# Patient Record
Sex: MALE | Race: WHITE | NOT HISPANIC OR LATINO
[De-identification: names, ages, dates, MRNs, and addresses within clinical notes are randomized per-mention and may not be internally consistent; named-entity substitution may affect disease eponyms.]

---

## 2017-08-23 ENCOUNTER — APPOINTMENT (OUTPATIENT)
Dept: FAMILY MEDICINE | Facility: CLINIC | Age: 62
End: 2017-08-23
Payer: COMMERCIAL

## 2017-08-23 VITALS
WEIGHT: 212.13 LBS | HEIGHT: 73 IN | SYSTOLIC BLOOD PRESSURE: 130 MMHG | DIASTOLIC BLOOD PRESSURE: 72 MMHG | BODY MASS INDEX: 28.11 KG/M2

## 2017-08-23 VITALS — DIASTOLIC BLOOD PRESSURE: 80 MMHG | SYSTOLIC BLOOD PRESSURE: 110 MMHG | HEART RATE: 72 BPM | RESPIRATION RATE: 16 BRPM

## 2017-08-23 PROCEDURE — 99214 OFFICE O/P EST MOD 30 MIN: CPT | Mod: 25

## 2017-08-23 PROCEDURE — G0008: CPT

## 2017-08-23 PROCEDURE — 36415 COLL VENOUS BLD VENIPUNCTURE: CPT

## 2017-08-23 PROCEDURE — 90688 IIV4 VACCINE SPLT 0.5 ML IM: CPT

## 2017-08-23 PROCEDURE — 90472 IMMUNIZATION ADMIN EACH ADD: CPT

## 2017-08-23 PROCEDURE — 90732 PPSV23 VACC 2 YRS+ SUBQ/IM: CPT

## 2017-08-24 LAB
ALBUMIN SERPL ELPH-MCNC: 4.3 G/DL
ALP BLD-CCNC: 91 U/L
ALT SERPL-CCNC: 28 U/L
ANION GAP SERPL CALC-SCNC: 15 MMOL/L
APPEARANCE: CLEAR
AST SERPL-CCNC: 26 U/L
BACTERIA: NEGATIVE
BASOPHILS # BLD AUTO: 0.03 K/UL
BASOPHILS NFR BLD AUTO: 0.5 %
BILIRUB SERPL-MCNC: 1.2 MG/DL
BILIRUBIN URINE: NEGATIVE
BLOOD URINE: NEGATIVE
BUN SERPL-MCNC: 9 MG/DL
CALCIUM SERPL-MCNC: 9.4 MG/DL
CHLORIDE SERPL-SCNC: 102 MMOL/L
CHOLEST SERPL-MCNC: 201 MG/DL
CHOLEST/HDLC SERPL: 5.3 RATIO
CO2 SERPL-SCNC: 23 MMOL/L
COLOR: YELLOW
CREAT SERPL-MCNC: 0.87 MG/DL
CREAT SPEC-SCNC: 114 MG/DL
EOSINOPHIL # BLD AUTO: 0.09 K/UL
EOSINOPHIL NFR BLD AUTO: 1.4 %
GLUCOSE QUALITATIVE U: NORMAL MG/DL
GLUCOSE SERPL-MCNC: 90 MG/DL
HBA1C MFR BLD HPLC: 7.7 %
HCT VFR BLD CALC: 45.8 %
HDLC SERPL-MCNC: 38 MG/DL
HGB BLD-MCNC: 15.6 G/DL
HYALINE CASTS: 0 /LPF
IMM GRANULOCYTES NFR BLD AUTO: 0.2 %
KETONES URINE: NEGATIVE
LDLC SERPL CALC-MCNC: 143 MG/DL
LEUKOCYTE ESTERASE URINE: NEGATIVE
LYMPHOCYTES # BLD AUTO: 2.2 K/UL
LYMPHOCYTES NFR BLD AUTO: 33.5 %
MAN DIFF?: NORMAL
MCHC RBC-ENTMCNC: 29.8 PG
MCHC RBC-ENTMCNC: 34.1 GM/DL
MCV RBC AUTO: 87.6 FL
MICROALBUMIN 24H UR DL<=1MG/L-MCNC: 0.5 MG/DL
MICROALBUMIN/CREAT 24H UR-RTO: 4 MG/G
MICROSCOPIC-UA: NORMAL
MONOCYTES # BLD AUTO: 0.52 K/UL
MONOCYTES NFR BLD AUTO: 7.9 %
NEUTROPHILS # BLD AUTO: 3.72 K/UL
NEUTROPHILS NFR BLD AUTO: 56.5 %
NITRITE URINE: NEGATIVE
PH URINE: 6
PLATELET # BLD AUTO: 283 K/UL
POTASSIUM SERPL-SCNC: 4.4 MMOL/L
PROT SERPL-MCNC: 7.5 G/DL
PROTEIN URINE: NEGATIVE MG/DL
RBC # BLD: 5.23 M/UL
RBC # FLD: 12.6 %
RED BLOOD CELLS URINE: 1 /HPF
SODIUM SERPL-SCNC: 140 MMOL/L
SPECIFIC GRAVITY URINE: 1.01
SQUAMOUS EPITHELIAL CELLS: 0 /HPF
T4 SERPL-MCNC: 7.3 UG/DL
TRIGL SERPL-MCNC: 102 MG/DL
TSH SERPL-ACNC: 3.61 UIU/ML
URATE SERPL-MCNC: 4.5 MG/DL
UROBILINOGEN URINE: NORMAL MG/DL
WBC # FLD AUTO: 6.57 K/UL
WHITE BLOOD CELLS URINE: 0 /HPF

## 2017-09-11 ENCOUNTER — RX RENEWAL (OUTPATIENT)
Age: 62
End: 2017-09-11

## 2017-09-14 ENCOUNTER — RX RENEWAL (OUTPATIENT)
Age: 62
End: 2017-09-14

## 2017-09-20 ENCOUNTER — TRANSCRIPTION ENCOUNTER (OUTPATIENT)
Age: 62
End: 2017-09-20

## 2017-09-27 ENCOUNTER — APPOINTMENT (OUTPATIENT)
Dept: FAMILY MEDICINE | Facility: CLINIC | Age: 62
End: 2017-09-27
Payer: COMMERCIAL

## 2017-09-27 ENCOUNTER — RX RENEWAL (OUTPATIENT)
Age: 62
End: 2017-09-27

## 2017-09-27 ENCOUNTER — APPOINTMENT (OUTPATIENT)
Dept: CARDIOLOGY | Facility: CLINIC | Age: 62
End: 2017-09-27
Payer: COMMERCIAL

## 2017-09-27 ENCOUNTER — NON-APPOINTMENT (OUTPATIENT)
Age: 62
End: 2017-09-27

## 2017-09-27 VITALS
BODY MASS INDEX: 27.43 KG/M2 | HEIGHT: 73 IN | SYSTOLIC BLOOD PRESSURE: 132 MMHG | OXYGEN SATURATION: 95 % | HEART RATE: 93 BPM | WEIGHT: 207 LBS | DIASTOLIC BLOOD PRESSURE: 81 MMHG

## 2017-09-27 VITALS
BODY MASS INDEX: 27.43 KG/M2 | SYSTOLIC BLOOD PRESSURE: 150 MMHG | OXYGEN SATURATION: 98 % | WEIGHT: 207 LBS | HEIGHT: 73 IN | HEART RATE: 96 BPM | DIASTOLIC BLOOD PRESSURE: 80 MMHG

## 2017-09-27 DIAGNOSIS — Z86.69 PERSONAL HISTORY OF OTHER DISEASES OF THE NERVOUS SYSTEM AND SENSE ORGANS: ICD-10-CM

## 2017-09-27 PROCEDURE — 99245 OFF/OP CONSLTJ NEW/EST HI 55: CPT

## 2017-09-27 PROCEDURE — 93000 ELECTROCARDIOGRAM COMPLETE: CPT

## 2017-09-27 PROCEDURE — 99214 OFFICE O/P EST MOD 30 MIN: CPT | Mod: 25

## 2017-09-28 ENCOUNTER — INPATIENT (INPATIENT)
Facility: HOSPITAL | Age: 62
LOS: 5 days | Discharge: HOME | DRG: 233 | End: 2017-10-04
Attending: THORACIC SURGERY (CARDIOTHORACIC VASCULAR SURGERY) | Admitting: INTERNAL MEDICINE
Payer: COMMERCIAL

## 2017-09-28 ENCOUNTER — RX RENEWAL (OUTPATIENT)
Age: 62
End: 2017-09-28

## 2017-09-28 VITALS
DIASTOLIC BLOOD PRESSURE: 74 MMHG | OXYGEN SATURATION: 98 % | RESPIRATION RATE: 18 BRPM | HEART RATE: 92 BPM | HEIGHT: 73 IN | SYSTOLIC BLOOD PRESSURE: 136 MMHG | TEMPERATURE: 99 F | WEIGHT: 207.01 LBS

## 2017-09-28 DIAGNOSIS — E78.5 HYPERLIPIDEMIA, UNSPECIFIED: ICD-10-CM

## 2017-09-28 DIAGNOSIS — E10.59 TYPE 1 DIABETES MELLITUS WITH OTHER CIRCULATORY COMPLICATIONS: ICD-10-CM

## 2017-09-28 DIAGNOSIS — I25.118 ATHEROSCLEROTIC HEART DISEASE OF NATIVE CORONARY ARTERY WITH OTHER FORMS OF ANGINA PECTORIS: ICD-10-CM

## 2017-09-28 DIAGNOSIS — Z98.890 OTHER SPECIFIED POSTPROCEDURAL STATES: Chronic | ICD-10-CM

## 2017-09-28 DIAGNOSIS — I20.0 UNSTABLE ANGINA: ICD-10-CM

## 2017-09-28 DIAGNOSIS — I10 ESSENTIAL (PRIMARY) HYPERTENSION: ICD-10-CM

## 2017-09-28 LAB
ALBUMIN SERPL ELPH-MCNC: 4.2 G/DL — SIGNIFICANT CHANGE UP (ref 3.3–5)
ALBUMIN SERPL ELPH-MCNC: 4.3 G/DL — SIGNIFICANT CHANGE UP (ref 3.3–5)
ALP SERPL-CCNC: 84 U/L — SIGNIFICANT CHANGE UP (ref 40–120)
ALP SERPL-CCNC: 87 U/L — SIGNIFICANT CHANGE UP (ref 40–120)
ALT FLD-CCNC: 20 U/L RC — SIGNIFICANT CHANGE UP (ref 10–45)
ALT FLD-CCNC: 20 U/L RC — SIGNIFICANT CHANGE UP (ref 10–45)
ANION GAP SERPL CALC-SCNC: 18 MMOL/L — HIGH (ref 5–17)
APPEARANCE UR: CLEAR — SIGNIFICANT CHANGE UP
APTT BLD: 32.8 SEC — SIGNIFICANT CHANGE UP (ref 27.5–37.4)
APTT BLD: 34.3 SEC — SIGNIFICANT CHANGE UP (ref 27.5–37.4)
AST SERPL-CCNC: 20 U/L — SIGNIFICANT CHANGE UP (ref 10–40)
AST SERPL-CCNC: 24 U/L — SIGNIFICANT CHANGE UP (ref 10–40)
BILIRUB DIRECT SERPL-MCNC: 0.4 MG/DL — HIGH (ref 0–0.2)
BILIRUB INDIRECT FLD-MCNC: 0.9 MG/DL — SIGNIFICANT CHANGE UP (ref 0.2–1)
BILIRUB SERPL-MCNC: 1.3 MG/DL — HIGH (ref 0.2–1.2)
BILIRUB SERPL-MCNC: 1.4 MG/DL — HIGH (ref 0.2–1.2)
BILIRUB UR-MCNC: NEGATIVE — SIGNIFICANT CHANGE UP
BLD GP AB SCN SERPL QL: POSITIVE — SIGNIFICANT CHANGE UP
BUN SERPL-MCNC: 13 MG/DL — SIGNIFICANT CHANGE UP (ref 7–23)
CALCIUM SERPL-MCNC: 9.5 MG/DL — SIGNIFICANT CHANGE UP (ref 8.4–10.5)
CHLORIDE SERPL-SCNC: 99 MMOL/L — SIGNIFICANT CHANGE UP (ref 96–108)
CHOLEST SERPL-MCNC: 180 MG/DL — SIGNIFICANT CHANGE UP (ref 10–199)
CO2 SERPL-SCNC: 22 MMOL/L — SIGNIFICANT CHANGE UP (ref 22–31)
COLOR SPEC: YELLOW — SIGNIFICANT CHANGE UP
CREAT SERPL-MCNC: 0.96 MG/DL — SIGNIFICANT CHANGE UP (ref 0.5–1.3)
DAT POLY-SP REAG RBC QL: NEGATIVE — SIGNIFICANT CHANGE UP
DIFF PNL FLD: NEGATIVE — SIGNIFICANT CHANGE UP
GLUCOSE SERPL-MCNC: 228 MG/DL — HIGH (ref 70–99)
GLUCOSE UR QL: >1000
HBA1C BLD-MCNC: 7.5 % — HIGH (ref 4–5.6)
HCT VFR BLD CALC: 41.1 % — SIGNIFICANT CHANGE UP (ref 39–50)
HCT VFR BLD CALC: 46.8 % — SIGNIFICANT CHANGE UP (ref 39–50)
HGB BLD-MCNC: 14.7 G/DL — SIGNIFICANT CHANGE UP (ref 13–17)
HGB BLD-MCNC: 16 G/DL — SIGNIFICANT CHANGE UP (ref 13–17)
INR BLD: 1.07 RATIO — SIGNIFICANT CHANGE UP (ref 0.88–1.16)
KETONES UR-MCNC: ABNORMAL
LEUKOCYTE ESTERASE UR-ACNC: NEGATIVE — SIGNIFICANT CHANGE UP
MCHC RBC-ENTMCNC: 30.9 PG — SIGNIFICANT CHANGE UP (ref 27–34)
MCHC RBC-ENTMCNC: 32.2 PG — SIGNIFICANT CHANGE UP (ref 27–34)
MCHC RBC-ENTMCNC: 34.2 GM/DL — SIGNIFICANT CHANGE UP (ref 32–36)
MCHC RBC-ENTMCNC: 35.6 GM/DL — SIGNIFICANT CHANGE UP (ref 32–36)
MCV RBC AUTO: 90.2 FL — SIGNIFICANT CHANGE UP (ref 80–100)
MCV RBC AUTO: 90.3 FL — SIGNIFICANT CHANGE UP (ref 80–100)
NITRITE UR-MCNC: NEGATIVE — SIGNIFICANT CHANGE UP
NT-PROBNP SERPL-SCNC: 863 PG/ML — HIGH (ref 0–300)
NT-PROBNP SERPL-SCNC: 985 PG/ML — HIGH (ref 0–300)
PH UR: 6 — SIGNIFICANT CHANGE UP (ref 5–8)
PLATELET # BLD AUTO: 254 K/UL — SIGNIFICANT CHANGE UP (ref 150–400)
PLATELET # BLD AUTO: 272 K/UL — SIGNIFICANT CHANGE UP (ref 150–400)
POTASSIUM SERPL-MCNC: 4.3 MMOL/L — SIGNIFICANT CHANGE UP (ref 3.5–5.3)
POTASSIUM SERPL-SCNC: 4.3 MMOL/L — SIGNIFICANT CHANGE UP (ref 3.5–5.3)
PROT SERPL-MCNC: 7.3 G/DL — SIGNIFICANT CHANGE UP (ref 6–8.3)
PROT SERPL-MCNC: 7.6 G/DL — SIGNIFICANT CHANGE UP (ref 6–8.3)
PROT UR-MCNC: SIGNIFICANT CHANGE UP
PROTHROM AB SERPL-ACNC: 11.7 SEC — SIGNIFICANT CHANGE UP (ref 9.8–12.7)
RBC # BLD: 4.55 M/UL — SIGNIFICANT CHANGE UP (ref 4.2–5.8)
RBC # BLD: 5.18 M/UL — SIGNIFICANT CHANGE UP (ref 4.2–5.8)
RBC # FLD: 11.3 % — SIGNIFICANT CHANGE UP (ref 10.3–14.5)
RBC # FLD: 11.4 % — SIGNIFICANT CHANGE UP (ref 10.3–14.5)
RH IG SCN BLD-IMP: POSITIVE — SIGNIFICANT CHANGE UP
RH IG SCN BLD-IMP: POSITIVE — SIGNIFICANT CHANGE UP
SODIUM SERPL-SCNC: 139 MMOL/L — SIGNIFICANT CHANGE UP (ref 135–145)
SP GR SPEC: >1.03 — HIGH (ref 1.01–1.02)
T3 SERPL-MCNC: 126 NG/DL — SIGNIFICANT CHANGE UP (ref 80–200)
T4 AB SER-ACNC: 7.5 UG/DL — SIGNIFICANT CHANGE UP (ref 4.6–12)
TSH SERPL-MCNC: 4.83 UIU/ML — HIGH (ref 0.27–4.2)
UROBILINOGEN FLD QL: NEGATIVE — SIGNIFICANT CHANGE UP
WBC # BLD: 12.4 K/UL — HIGH (ref 3.8–10.5)
WBC # BLD: 8.2 K/UL — SIGNIFICANT CHANGE UP (ref 3.8–10.5)
WBC # FLD AUTO: 12.4 K/UL — HIGH (ref 3.8–10.5)
WBC # FLD AUTO: 8.2 K/UL — SIGNIFICANT CHANGE UP (ref 3.8–10.5)

## 2017-09-28 PROCEDURE — 99223 1ST HOSP IP/OBS HIGH 75: CPT | Mod: GC

## 2017-09-28 PROCEDURE — 93306 TTE W/DOPPLER COMPLETE: CPT | Mod: 26

## 2017-09-28 PROCEDURE — 93880 EXTRACRANIAL BILAT STUDY: CPT | Mod: 26

## 2017-09-28 PROCEDURE — 71010: CPT | Mod: 26

## 2017-09-28 PROCEDURE — 86077 PHYS BLOOD BANK SERV XMATCH: CPT

## 2017-09-28 RX ORDER — DEXTROSE 50 % IN WATER 50 %
25 SYRINGE (ML) INTRAVENOUS ONCE
Qty: 0 | Refills: 0 | Status: DISCONTINUED | OUTPATIENT
Start: 2017-09-28 | End: 2017-09-29

## 2017-09-28 RX ORDER — INSULIN GLARGINE 100 [IU]/ML
33 INJECTION, SOLUTION SUBCUTANEOUS ONCE
Qty: 0 | Refills: 0 | Status: COMPLETED | OUTPATIENT
Start: 2017-09-28 | End: 2017-09-28

## 2017-09-28 RX ORDER — DEXTROSE 50 % IN WATER 50 %
12.5 SYRINGE (ML) INTRAVENOUS ONCE
Qty: 0 | Refills: 0 | Status: DISCONTINUED | OUTPATIENT
Start: 2017-09-28 | End: 2017-09-29

## 2017-09-28 RX ORDER — SODIUM CHLORIDE 9 MG/ML
1000 INJECTION, SOLUTION INTRAVENOUS
Qty: 0 | Refills: 0 | Status: DISCONTINUED | OUTPATIENT
Start: 2017-09-28 | End: 2017-09-29

## 2017-09-28 RX ORDER — CHLORHEXIDINE GLUCONATE 213 G/1000ML
1 SOLUTION TOPICAL ONCE
Qty: 0 | Refills: 0 | Status: COMPLETED | OUTPATIENT
Start: 2017-09-28 | End: 2017-09-28

## 2017-09-28 RX ORDER — CHLORHEXIDINE GLUCONATE 213 G/1000ML
30 SOLUTION TOPICAL ONCE
Qty: 0 | Refills: 0 | Status: COMPLETED | OUTPATIENT
Start: 2017-09-28 | End: 2017-09-29

## 2017-09-28 RX ORDER — CEFUROXIME AXETIL 250 MG
1500 TABLET ORAL ONCE
Qty: 0 | Refills: 0 | Status: DISCONTINUED | OUTPATIENT
Start: 2017-09-28 | End: 2017-09-29

## 2017-09-28 RX ORDER — INSULIN GLARGINE 100 [IU]/ML
48 INJECTION, SOLUTION SUBCUTANEOUS EVERY MORNING
Qty: 0 | Refills: 0 | Status: DISCONTINUED | OUTPATIENT
Start: 2017-09-29 | End: 2017-09-29

## 2017-09-28 RX ORDER — ASPIRIN/CALCIUM CARB/MAGNESIUM 324 MG
81 TABLET ORAL DAILY
Qty: 0 | Refills: 0 | Status: DISCONTINUED | OUTPATIENT
Start: 2017-09-28 | End: 2017-09-29

## 2017-09-28 RX ORDER — HEPARIN SODIUM 5000 [USP'U]/ML
5600 INJECTION INTRAVENOUS; SUBCUTANEOUS EVERY 6 HOURS
Qty: 0 | Refills: 0 | Status: DISCONTINUED | OUTPATIENT
Start: 2017-09-28 | End: 2017-09-29

## 2017-09-28 RX ORDER — METOPROLOL TARTRATE 50 MG
25 TABLET ORAL
Qty: 0 | Refills: 0 | Status: DISCONTINUED | OUTPATIENT
Start: 2017-09-28 | End: 2017-09-29

## 2017-09-28 RX ORDER — INSULIN LISPRO 100/ML
VIAL (ML) SUBCUTANEOUS AT BEDTIME
Qty: 0 | Refills: 0 | Status: DISCONTINUED | OUTPATIENT
Start: 2017-09-28 | End: 2017-09-29

## 2017-09-28 RX ORDER — DEXTROSE 50 % IN WATER 50 %
1 SYRINGE (ML) INTRAVENOUS ONCE
Qty: 0 | Refills: 0 | Status: DISCONTINUED | OUTPATIENT
Start: 2017-09-28 | End: 2017-09-29

## 2017-09-28 RX ORDER — ATORVASTATIN CALCIUM 80 MG/1
80 TABLET, FILM COATED ORAL AT BEDTIME
Qty: 0 | Refills: 0 | Status: DISCONTINUED | OUTPATIENT
Start: 2017-09-28 | End: 2017-09-29

## 2017-09-28 RX ORDER — GLUCAGON INJECTION, SOLUTION 0.5 MG/.1ML
1 INJECTION, SOLUTION SUBCUTANEOUS ONCE
Qty: 0 | Refills: 0 | Status: DISCONTINUED | OUTPATIENT
Start: 2017-09-28 | End: 2017-09-29

## 2017-09-28 RX ORDER — INSULIN LISPRO 100/ML
VIAL (ML) SUBCUTANEOUS
Qty: 0 | Refills: 0 | Status: DISCONTINUED | OUTPATIENT
Start: 2017-09-28 | End: 2017-09-29

## 2017-09-28 RX ORDER — HEPARIN SODIUM 5000 [USP'U]/ML
INJECTION INTRAVENOUS; SUBCUTANEOUS
Qty: 25000 | Refills: 0 | Status: DISCONTINUED | OUTPATIENT
Start: 2017-09-28 | End: 2017-09-29

## 2017-09-28 RX ORDER — ALPRAZOLAM 0.25 MG
0.25 TABLET ORAL THREE TIMES A DAY
Qty: 0 | Refills: 0 | Status: DISCONTINUED | OUTPATIENT
Start: 2017-09-28 | End: 2017-09-29

## 2017-09-28 RX ORDER — INSULIN LISPRO 100/ML
6 VIAL (ML) SUBCUTANEOUS
Qty: 0 | Refills: 0 | Status: DISCONTINUED | OUTPATIENT
Start: 2017-09-28 | End: 2017-09-29

## 2017-09-28 RX ADMIN — HEPARIN SODIUM 1000 UNIT(S)/HR: 5000 INJECTION INTRAVENOUS; SUBCUTANEOUS at 16:35

## 2017-09-28 RX ADMIN — Medication 2: at 16:50

## 2017-09-28 RX ADMIN — Medication 81 MILLIGRAM(S): at 14:01

## 2017-09-28 RX ADMIN — Medication 6 UNIT(S): at 12:43

## 2017-09-28 RX ADMIN — Medication: at 12:43

## 2017-09-28 RX ADMIN — CHLORHEXIDINE GLUCONATE 1 APPLICATION(S): 213 SOLUTION TOPICAL at 21:56

## 2017-09-28 RX ADMIN — ATORVASTATIN CALCIUM 80 MILLIGRAM(S): 80 TABLET, FILM COATED ORAL at 21:57

## 2017-09-28 RX ADMIN — Medication 6 UNIT(S): at 16:51

## 2017-09-28 RX ADMIN — Medication 25 MILLIGRAM(S): at 17:50

## 2017-09-28 RX ADMIN — HEPARIN SODIUM 1300 UNIT(S)/HR: 5000 INJECTION INTRAVENOUS; SUBCUTANEOUS at 23:50

## 2017-09-28 RX ADMIN — INSULIN GLARGINE 33 UNIT(S): 100 INJECTION, SOLUTION SUBCUTANEOUS at 11:55

## 2017-09-28 RX ADMIN — Medication 0.25 MILLIGRAM(S): at 23:54

## 2017-09-28 NOTE — CONSULT NOTE ADULT - ASSESSMENT
61 y/o M DM1, HTN now s/p cardiac cath revealing triple vessel, undergoing evaluation for CABG; also with uncontrolled DM1 with no known microvascular complications 63 y/o M DM1, HTN now s/p cardiac cath revealing triple vessel, undergoing evaluation for CABG; also with uncontrolled DM1 with no known microvascular complications, HTN, HLD (high risk patient with high level decision-making).

## 2017-09-28 NOTE — CONSULT NOTE ADULT - PROBLEM SELECTOR RECOMMENDATION 9
- patient with uncontrolled DM1 with no microvascular complications, however with macrovascular complications  - recommend continue with basal bolus insulin regimen - patient received 33 units of lantus at 11 am today; recommendation additional....  - can continue with humalog 6 units before meals with low correction sliding scale  - patient NPO after midnight tonight - recommend 48 units of lantus tomorrow AM  - consistent carbohydrate diet  - recommend check lipid profile, agree with high intensity statin (lipitor 80 mg qhs)  - plan to discharge on basal bolus insulin, doses to be determined  - can follow with PMD after discharge; can also follow in office on discharge - [phone] - patient with uncontrolled DM1 with no microvascular complications, however with macrovascular complications  - recommend continue with basal bolus insulin regimen - patient received 33 units of lantus at 11 am today; recommendation additional...  - can increase humalog to 8 units before meals with low correction sliding scale  - patient NPO after midnight tonight - recommend 48 units of lantus tomorrow AM  - consistent carbohydrate diet  - agree with high intensity statin (lipitor 80 mg qhs)  - plan to discharge on basal bolus insulin, doses to be determined  - can follow with PMD after discharge; can also follow in office on discharge - [phone] - patient with uncontrolled DM1 with no microvascular complications, however with macrovascular complications  - recommend continue with basal bolus insulin regimen - patient received 33 units of lantus at 11 am today  - can c/w humalog 6 units before meals with low correction sliding scale  - patient NPO after midnight tonight - recommend 48 units of lantus tomorrow AM  - consistent carbohydrate diet  - agree with high intensity statin (lipitor 80 mg qhs)  - plan to discharge on basal bolus insulin, doses to be determined  - can follow with PMD after discharge; can also follow in office on discharge - [phone] - patient with uncontrolled DM1 with no microvascular complications, however with macrovascular complications  - recommend continue with basal bolus insulin regimen - patient received 33 units of lantus at 11 am today  - can c/w humalog 6 units before meals with low correction sliding scale  - patient NPO after midnight tonight - recommend 48 units of lantus tomorrow AM  - consistent carbohydrate diet  - agree with high intensity statin (lipitor 80 mg qhs)  Plan for insulin gtt pre and post-op for CABG. Will transition back to basal bolus around POD #2  - plan to discharge on basal bolus insulin, doses to be determined  - can follow with PMD after discharge; can also follow in office on discharge - [phone]

## 2017-09-28 NOTE — H&P CARDIOLOGY - HISTORY OF PRESENT ILLNESS
This is a 63 yo male PMH DM 1 managed by Dr. Roberson A1C 7.7, HTN, RBBB presented to cardiology, Dr. Condon, with complaints of nonradiating substernal chest pain that occurs during exercise and relieved with rest after approx 2 hours that began approx 1 week ago. Pt. is an avid exerciser and rides a bike up to 30 miles.  Pt. denies SOB/OCAMPO, dizziness, diaphoresis, palpitations, nausea, vomiting, syncope, recent weight gain, peripheral edema, or syncope.  Pt. presents today asymptomatic for further evaluation and cardiac cath.

## 2017-09-28 NOTE — CONSULT NOTE ADULT - ASSESSMENT
61 y/o male with type1 DM,  HTN, HLD sunken chest surgery at 16y/o s/p cardiac cath in the setting of exercised induced angina cath revealing 3vd %, Diag95%, cx 98%, cardiac surgery consulted for CABG with Dr Castañeda

## 2017-09-28 NOTE — CONSULT NOTE ADULT - PROBLEM SELECTOR PROBLEM 1
Coronary artery disease of native artery of native heart with stable angina pectoris
Uncontrolled type 1 diabetes mellitus with other circulatory complication

## 2017-09-28 NOTE — PROGRESS NOTE ADULT - SUBJECTIVE AND OBJECTIVE BOX
Cardiac Surgery Pre-op Note:    CC: Patient is a 62y old  Male who presents with a chief complaint of chest pain    Referring Physician: Dr Decker                                                                                                           Surgeon: Dr Castañeda     Procedure:17 CABG    Allergies    No Known Allergies    Intolerances        HPI:  This is a 61 yo male PMH DM 1 managed by Dr. Roberson A1C 7.7, HTN, RBBB presented to cardiology, Dr. Condon, with complaints of nonradiating substernal chest pain that occurs during exercise and relieved with rest after approx 2 hours that began approx 1 week ago. Pt. is an avid exerciser and rides a bike up to 30 miles.  Pt. denies SOB/OCAMPO, dizziness, diaphoresis, palpitations, nausea, vomiting, syncope, recent weight gain, peripheral edema, or syncope.  Pt. presents today asymptomatic for further evaluation and cardiac cath. (28 Sep 2017 09:19)  Cardai cath revealed  3VD requiring CABG       PAST MEDICAL & SURGICAL HISTORY:  RBBB  Diabetes mellitus  Chest pain  Hypertension  History of shoulder surgery  History of knee surgery      MEDICATIONS  (STANDING):  aspirin enteric coated 81 milliGRAM(s) Oral daily  insulin lispro Injectable (HumaLOG) 6 Unit(s) SubCutaneous three times a day before meals  insulin lispro (HumaLOG) corrective regimen sliding scale   SubCutaneous three times a day before meals  insulin lispro (HumaLOG) corrective regimen sliding scale   SubCutaneous at bedtime  chlorhexidine 0.12% Liquid 30 milliLiter(s) Swish and Spit once  cefuroxime  IVPB 1500 milliGRAM(s) IV Intermittent once  heparin  Infusion.  Unit(s)/Hr (10 mL/Hr) IV Continuous <Continuous>  metoprolol 25 milliGRAM(s) Oral two times a day  atorvastatin 80 milliGRAM(s) Oral at bedtime    MEDICATIONS  (PRN):  dextrose Gel 1 Dose(s) Oral once PRN Blood Glucose LESS THAN 70 milliGRAM(s)/deciliter  glucagon  Injectable 1 milliGRAM(s) IntraMuscular once PRN Glucose LESS THAN 70 milligrams/deciliter  ALPRAZolam 0.25 milliGRAM(s) Oral three times a day PRN anxiety  heparin  Injectable 5600 Unit(s) IV Push every 6 hours PRN For aPTT less than 40        Labs:                        16.0   12.4  )-----------( 272      ( 28 Sep 2017 09:22 )             46.8         139  |  99  |  13  ----------------------------<  228<H>  4.3   |  22  |  0.96    Ca    9.5      28 Sep 2017 09:22    TPro  7.3  /  Alb  4.2  /  TBili  1.3<H>  /  DBili  0.4<H>  /  AST  20  /  ALT  20  /  AlkPhos  84      PT/INR - ( 28 Sep 2017 11:58 )   PT: 11.7 sec;   INR: 1.07 ratio         PTT - ( 28 Sep 2017 11:58 )  PTT:34.3 sec    Blood Type: ABO Interpretation: O ( @ 16:40)    HGB A1C: Hemoglobin A1C, Whole Blood: 7.5 % ( @ 13:56)    Prealbumin:   Pro-BNP: Serum Pro-Brain Natriuretic Peptide: 985 pg/mL ( @ 16:36)    Thyroid Panel:  @ 13:46/4.83  --/7.5/126    MRSA:  / MSSA:   Urinalysis Basic - ( 28 Sep 2017 18:38 )    Color: Yellow / Appearance: Clear / SG: >1.030 / pH: x  Gluc: x / Ketone: Moderate  / Bili: Negative / Urobili: Negative   Blood: x / Protein: Trace / Nitrite: Negative   Leuk Esterase: Negative / RBC: x / WBC x   Sq Epi: x / Non Sq Epi: x / Bacteria: x        CXR: 17 clear     EKG:SR     Carotid Duplex:  performed official read pending     PFT's: to be done in am     Echocardiogram: < from: TTE with Doppler (w/Cont) (17 @ 12:59) >  Conclusions:  1. Increased relative wall thickness with normal left  ventricular mass index, consistent with concentric left  ventricular remodeling.  2. Endocardial visualization enhanced with intravenous  injection of echo contrast (Definity). Normal left  ventricular systolic function.  *** No previous Echo exam.    < end of copied text >      Cardiac catheterization:         Daily Height in cm: 185.42 (28 Sep 2017 09:19)    Daily Weight in k.9 (28 Sep 2017 09:19)    Telemetry   SR 80s  T(C): 36.9 (17 @ 13:40), Max: 37.3 (17 @ 09:19)  < from: Cardiac Cath Lab - Adult (17 @ 09:50) >  CORONARY VESSELS: The coronary circulation is right dominant.  LM:   --  LM: Normal.  LAD:   --  Mid LAD: There was a 100 % stenosis.  CX:   --  Ostial circumflex: There was a 80 % stenosis.  RCA:   --  RPDA: There was a 100 % stenosis.    < end of copied text >  HR: 89 (17 @ 13:40) (89 - 96)  BP: 124/78 (17 @ 13:40) (124/78 - 149/67)  RR: 18 (17 @ 13:40) (16 - 18)  SpO2: 96% (17 @ 13:40) (96% - 98%)  Wt(kg): --    Gen: WN/WD NAD  Neuro: AAOx3, nonfocal  Pulm: CTA B/L  CV: RRR, S1S2  Abd: Soft, NT, ND +BS  Ext: No edema, + peripheral pulses cath site Rt wrist no hematoma       Pt has AICD/PPM [ ] Yes  [x ] No             Brand Name:  Pre-op Beta Blocker ordered within 24 hrs of surgery (CABG ONLY)?  [ ] Yes  [ x] No  If not, Why?  Type & Cross  [x ] Yes  [ ] No  NPO after Midnight [ x] Yes  [ ] No  Pre-op ABX ordered, to be taped on chart:  [ ] Yes  [ ] No     Hibiclens/Peridex ordered [ x] Yes  [ ] No  Consent  already  obtained   by surgeon

## 2017-09-28 NOTE — CONSULT NOTE ADULT - SUBJECTIVE AND OBJECTIVE BOX
History of Present Illness:  62y Male with DM1, HTN, HLD s/p cath revealing 3vd     Past Medical History  RBBB  Diabetes mellitus  Chest pain  Hypertension      Past Surgical History  Sunken chest at age 17  History of shoulder surgery  History of knee surgery      MEDICATIONS  (STANDING):  aspirin enteric coated 81 milliGRAM(s) Oral daily  insulin lispro Injectable (HumaLOG) 6 Unit(s) SubCutaneous three times a day before meals  insulin lispro (HumaLOG) corrective regimen sliding scale   SubCutaneous three times a day before meals  insulin lispro (HumaLOG) corrective regimen sliding scale   SubCutaneous at bedtime  dextrose 5%. 1000 milliLiter(s) (50 mL/Hr) IV Continuous <Continuous>  dextrose 50% Injectable 12.5 Gram(s) IV Push once  dextrose 50% Injectable 25 Gram(s) IV Push once  dextrose 50% Injectable 25 Gram(s) IV Push once  chlorhexidine 4% Liquid 1 Application(s) Topical once  chlorhexidine 0.12% Liquid 30 milliLiter(s) Swish and Spit once  cefuroxime  IVPB 1500 milliGRAM(s) IV Intermittent once    MEDICATIONS  (PRN):  dextrose Gel 1 Dose(s) Oral once PRN Blood Glucose LESS THAN 70 milliGRAM(s)/deciliter  glucagon  Injectable 1 milliGRAM(s) IntraMuscular once PRN Glucose LESS THAN 70 milligrams/deciliter  ALPRAZolam 0.25 milliGRAM(s) Oral three times a day PRN anxiety    Antiplatelet therapy:          asa                 Last dose/amt: 81mg po daily     Allergies: No Known Allergies      SOCIAL HISTORY:  Smoker: [ ] Yes  [x ] No        PACK YEARS:                         WHEN QUIT?  ETOH use: [ ] Yes  [x ] No              FREQUENCY / QUANTITY:  Ilicit Drug use:  [ ] Yes  [ x] No  Occupation:    Live with: alone but son lives locally  Assist device use: none     Relevant Family History  FAMILY HISTORY:  CAD (coronary artery disease) father with CAD      Review of Systems  GENERAL:  Fevers[] chills[] sweats[] fatigue[] weight loss[] weight gain []                                        NEURO:  parathesias[] seizures []  syncope []  confusion []                                                                                  EYES: glasses[]  blurry vision[]  discharge[] pain[] glaucoma []                                                                            ENMT:  difficulty hearing []  vertigo[]  dysphagia[] epistaxis[] recent dental work []                                      CV:  chest pain[x] palpitations[] OCAMPO [] diaphoresis [] edema[]                                                                                             RESPIRATORY:  wheezing[] SOB[] cough [] sputum[] hemoptysis[]                                                                    GI:  nausea[]  vomiting []  diarrhea[] constipation [] melena []                                                                        : hematuria[ ]  dysuria[ ] urgency[] incontinence[]                                                                                              MUSKULOSKELETAL:  arthritis[ ]  joint swelling [ ] muscle weakness [ ]                                                                  SKIN/BREAST:  rash[ ] itching [ ]  hair loss[ ] masses[ ]                                                                                                PSYCH:  dementia [ ] depresion [ ] anxiety[ ]                                                                                                                  HEME/LYMPH:  bruises easily[ ] enlarged lymph nodes[ ] tender lymph nodes[ ]                                                 ENDOCRINE:  cold intolerance[ ] heat intolerance[ ] polydipsia[ ]                                                                              PHYSICAL EXAM  tele  90  Vital Signs Last 24 Hrs  T(C): 36.9 (28 Sep 2017 13:40), Max: 37.3 (28 Sep 2017 09:19)  T(F): 98.4 (28 Sep 2017 13:40), Max: 99.2 (28 Sep 2017 09:19)  HR: 89 (28 Sep 2017 13:40) (89 - 96)  BP: 124/78 (28 Sep 2017 13:40) (124/78 - 149/67)  BP(mean): 94 (28 Sep 2017 09:19) (94 - 94)  RR: 18 (28 Sep 2017 13:40) (16 - 18)  SpO2: 96% (28 Sep 2017 13:40) (96% - 98%)    General: Well nourished, well developed, no acute distress.                                                         Neuro: Normal exam oriented to person/place & time with no focal motor or sensory  deficits.                    Eyes: Normal exam of conjunctiva & lids, pupils equally reactive.   ENT: Normal exam of nasal/oral mucosa with absence of cyanosis.   Neck: Normal exam of jugular veins, trachea & thyroid.   Chest: Normal lung exam with good air movement absence of wheezes, rales, or rhonchi:     midsternal scar present 	                                                                      CV:  Auscultation: normal [x ] S3[ ] S4[ ] Irregular [ ] Rub[ ] Clicks[ ]  Murmurs none:[x ]systolic [ ]  diastolic [ ] holosystolic [ ]  Carotids: No Bruits[ ] Other____________ Abdominal Aorta: normal [x ] nonpalpable(x ]                                                                         GI: Normal exam of abdomen, liver & spleen with no noted masses or tenderness.                                                                                              Extremities: Normal no evidence of cyanosis or deformity Edema: none[x ]trace[ ]1+[ ]2+[ ]3+[ ]4+[ ]  Lower Extremity Pulses: Right[+2 ] Left[+2 ]Varicosities[none  ]  SKIN : Normal exam to inspection & palpation.                                                           LABS:                        16.0   12.4  )-----------( 272      ( 28 Sep 2017 09:22 )             46.8     09-28    139  |  99  |  13  ----------------------------<  228<H>  4.3   |  22  |  0.96    Ca    9.5      28 Sep 2017 09:22    TPro  7.3  /  Alb  4.2  /  TBili  1.3<H>  /  DBili  0.4<H>  /  AST  20  /  ALT  20  /  AlkPhos  84  09-28    PT/INR - ( 28 Sep 2017 11:58 )   PT: 11.7 sec;   INR: 1.07 ratio         PTT - ( 28 Sep 2017 11:58 )  PTT:34.3 sec    TTE pending         Cardiac Cath:    Assessme< from: Cardiac Cath Lab - Adult (09.28.17 @ 09:50) >  VENTRICLES: EF estimated was 50 %.  CORONARY VESSELS: The coronary circulation is right dominant.  LM:   --  LM: Normal.  LAD:   --  Mid LAD: There was a 100 % stenosis.  CX:   --  Ostial circumflex: There was a 80 % stenosis.  RCA:   --  RPDA: There was a 100 % stenosis.    < end of copied text >  nt: History of Present Illness:  62y Male with DM1, HTN, HLD s/p cath revealing 3vd     Past Medical History  RBBB  Diabetes mellitus  Chest pain  Hypertension      Past Surgical History  Sunken chest at age 17  History of shoulder surgery  History of knee surgery      MEDICATIONS  (STANDING):  aspirin enteric coated 81 milliGRAM(s) Oral daily  insulin lispro Injectable (HumaLOG) 6 Unit(s) SubCutaneous three times a day before meals  insulin lispro (HumaLOG) corrective regimen sliding scale   SubCutaneous three times a day before meals  insulin lispro (HumaLOG) corrective regimen sliding scale   SubCutaneous at bedtime  dextrose 5%. 1000 milliLiter(s) (50 mL/Hr) IV Continuous <Continuous>  dextrose 50% Injectable 12.5 Gram(s) IV Push once  dextrose 50% Injectable 25 Gram(s) IV Push once  dextrose 50% Injectable 25 Gram(s) IV Push once  chlorhexidine 4% Liquid 1 Application(s) Topical once  chlorhexidine 0.12% Liquid 30 milliLiter(s) Swish and Spit once  cefuroxime  IVPB 1500 milliGRAM(s) IV Intermittent once    MEDICATIONS  (PRN):  dextrose Gel 1 Dose(s) Oral once PRN Blood Glucose LESS THAN 70 milliGRAM(s)/deciliter  glucagon  Injectable 1 milliGRAM(s) IntraMuscular once PRN Glucose LESS THAN 70 milligrams/deciliter  ALPRAZolam 0.25 milliGRAM(s) Oral three times a day PRN anxiety    Antiplatelet therapy:          asa                 Last dose/amt: 81mg po daily     Allergies: No Known Allergies      SOCIAL HISTORY:  Smoker: [ ] Yes  [x ] No        PACK YEARS:                         WHEN QUIT?  ETOH use: [ ] Yes  [x ] No              FREQUENCY / QUANTITY:  Ilicit Drug use:  [ ] Yes  [ x] No  Occupation:    Live with: alone but son lives locally  Assist device use: none     Relevant Family History  FAMILY HISTORY:  CAD (coronary artery disease) father with CAD      Review of Systems  GENERAL:  Fevers[] chills[] sweats[] fatigue[] weight loss[] weight gain []                                        NEURO:  parathesias[] seizures []  syncope []  confusion []                                                                                  EYES: glasses[]  blurry vision[]  discharge[] pain[] glaucoma []                                                                            ENMT:  difficulty hearing []  vertigo[]  dysphagia[] epistaxis[] recent dental work []                                      CV:  chest pain[x] palpitations[] OCAMPO [] diaphoresis [] edema[]                                                                                             RESPIRATORY:  wheezing[] SOB[] cough [] sputum[] hemoptysis[]                                                                    GI:  nausea[]  vomiting []  diarrhea[] constipation [] melena []                                                                        : hematuria[ ]  dysuria[ ] urgency[] incontinence[]                                                                                              MUSKULOSKELETAL:  arthritis[ ]  joint swelling [ ] muscle weakness [ ]                                                                  SKIN/BREAST:  rash[ ] itching [ ]  hair loss[ ] masses[ ]                                                                                                PSYCH:  dementia [ ] depresion [ ] anxiety[ ]                                                                                                                  HEME/LYMPH:  bruises easily[ ] enlarged lymph nodes[ ] tender lymph nodes[ ]                                                 ENDOCRINE:  cold intolerance[ ] heat intolerance[ ] polydipsia[ ]                                                                              PHYSICAL EXAM  tele  90  Vital Signs Last 24 Hrs  T(C): 36.9 (28 Sep 2017 13:40), Max: 37.3 (28 Sep 2017 09:19)  T(F): 98.4 (28 Sep 2017 13:40), Max: 99.2 (28 Sep 2017 09:19)  HR: 89 (28 Sep 2017 13:40) (89 - 96)  BP: 124/78 (28 Sep 2017 13:40) (124/78 - 149/67)  BP(mean): 94 (28 Sep 2017 09:19) (94 - 94)  RR: 18 (28 Sep 2017 13:40) (16 - 18)  SpO2: 96% (28 Sep 2017 13:40) (96% - 98%)    General: Well nourished, well developed, no acute distress.                                                         Neuro: Normal exam oriented to person/place & time with no focal motor or sensory  deficits.                    Eyes: Normal exam of conjunctiva & lids, pupils equally reactive.   ENT: Normal exam of nasal/oral mucosa with absence of cyanosis.   Neck: Normal exam of jugular veins, trachea & thyroid.   Chest: Normal lung exam with good air movement absence of wheezes, rales, or rhonchi:     midsternal scar present 	                                                                      CV:  Auscultation: normal [x ] S3[ ] S4[ ] Irregular [ ] Rub[ ] Clicks[ ]  Murmurs none:[x ]systolic [ ]  diastolic [ ] holosystolic [ ]  Carotids: No Bruits[ ] Other____________ Abdominal Aorta: normal [x ] nonpalpable(x ]                                                                         GI: Normal exam of abdomen, liver & spleen with no noted masses or tenderness.                                                                                              Extremities: Normal no evidence of cyanosis or deformity Edema: none[x ]trace[ ]1+[ ]2+[ ]3+[ ]4+[ ] Rt wrist  cath site no hematoma   Lower Extremity Pulses: Right[+2 ] Left[+2 ]Varicosities[none  ]  SKIN : Normal exam to inspection & palpation.                                                           LABS:                        16.0   12.4  )-----------( 272      ( 28 Sep 2017 09:22 )             46.8     09-28    139  |  99  |  13  ----------------------------<  228<H>  4.3   |  22  |  0.96    Ca    9.5      28 Sep 2017 09:22    TPro  7.3  /  Alb  4.2  /  TBili  1.3<H>  /  DBili  0.4<H>  /  AST  20  /  ALT  20  /  AlkPhos  84  09-28    PT/INR - ( 28 Sep 2017 11:58 )   PT: 11.7 sec;   INR: 1.07 ratio         PTT - ( 28 Sep 2017 11:58 )  PTT:34.3 sec    TTE pending         Cardiac Cath:    Assessme< from: Cardiac Cath Lab - Adult (09.28.17 @ 09:50) >  VENTRICLES: EF estimated was 50 %.  CORONARY VESSELS: The coronary circulation is right dominant.  LM:   --  LM: Normal.  LAD:   --  Mid LAD: There was a 100 % stenosis.  CX:   --  Ostial circumflex: There was a 80 % stenosis.  RCA:   --  RPDA: There was a 100 % stenosis.    < end of copied text >  nt:

## 2017-09-28 NOTE — PROGRESS NOTE ADULT - ASSESSMENT
61 y/o male with type1 DM,  HTN, HLD sunken chest surgery at 18y/o s/p cardiac cath in the setting of exercised induced angina cath revealing 3vd %, Diag95%, cx 98%, cardiac surgery consulted for CABG with Dr Castañeda

## 2017-09-28 NOTE — CONSULT NOTE ADULT - PROBLEM SELECTOR RECOMMENDATION 2
check hbg a1c   continue lantus and novolog
- patient normotensive at this time  - losartan on hold   - management as per cardiology

## 2017-09-28 NOTE — CONSULT NOTE ADULT - PROBLEM SELECTOR RECOMMENDATION 9
pre op work up ongoing: TTE/carotids/ PFTs  skin prep ongoing  Start heparin gtt for CAD  Added Betablocker  ARB stopped on case pending surgery

## 2017-09-28 NOTE — CONSULT NOTE ADULT - SUBJECTIVE AND OBJECTIVE BOX
HPI:  This is a 63 yo male PMH DM 1 managed by Dr. Roberson A1C 7.7, HTN, RBBB presented to cardiology, Dr. Condon, with complaints of nonradiating substernal chest pain that occurs during exercise and relieved with rest. Patient now here for cardiac cath.       PAST MEDICAL & SURGICAL HISTORY:  RBBB  Diabetes mellitus  Chest pain  Hypertension  History of shoulder surgery  History of knee surgery      FAMILY HISTORY:  CAD (coronary artery disease)      Social History:    Outpatient Medications:  · 	aspirin 81 mg oral tablet: Last Dose Taken:  , 1 tab(s) orally once a day  · 	Lantus 100 units/mL subcutaneous solution: Last Dose Taken:  , 60 unit(s) subcutaneous once a day AM  	(pt. took 15units this morning 9/28/17 insturcted to take remainder after procedure)  · 	NovoLOG 100 units/mL subcutaneous solution: Last Dose Taken:  , 8 unit(s) subcutaneous 3 times a day  · 	losartan 50 mg oral tablet: Last Dose Taken:  , 1 tab(s) orally once a day    MEDICATIONS  (STANDING):  aspirin enteric coated 81 milliGRAM(s) Oral daily  insulin glargine Injectable (LANTUS) 33 Unit(s) SubCutaneous once  insulin lispro Injectable (HumaLOG) 6 Unit(s) SubCutaneous three times a day before meals  insulin lispro (HumaLOG) corrective regimen sliding scale   SubCutaneous three times a day before meals  insulin lispro (HumaLOG) corrective regimen sliding scale   SubCutaneous at bedtime  dextrose 5%. 1000 milliLiter(s) (50 mL/Hr) IV Continuous <Continuous>  dextrose 50% Injectable 12.5 Gram(s) IV Push once  dextrose 50% Injectable 25 Gram(s) IV Push once  dextrose 50% Injectable 25 Gram(s) IV Push once  chlorhexidine 4% Liquid 1 Application(s) Topical once  chlorhexidine 0.12% Liquid 30 milliLiter(s) Swish and Spit once  cefuroxime  IVPB 1500 milliGRAM(s) IV Intermittent once    MEDICATIONS  (PRN):  dextrose Gel 1 Dose(s) Oral once PRN Blood Glucose LESS THAN 70 milliGRAM(s)/deciliter  glucagon  Injectable 1 milliGRAM(s) IntraMuscular once PRN Glucose LESS THAN 70 milligrams/deciliter      Allergies    No Known Allergies    Intolerances      Review of Systems:  Constitutional: No fever  Eyes: No blurry vision  Neuro: No tremors  HEENT: No pain  Cardiovascular: No chest pain, palpitations  Respiratory: No SOB, no cough  GI: No nausea, vomiting, abdominal pain  : No dysuria  Skin: no rash  Psych: no depression  Endocrine: no polyuria, polydipsia  Hem/lymph: no swelling  Osteoporosis: no fractures    ALL OTHER SYSTEMS REVIEWED AND NEGATIVE    UNABLE TO OBTAIN    PHYSICAL EXAM:  VITALS: T(C): 37.3 (09-28-17 @ 09:19)  T(F): 99.2 (09-28-17 @ 09:19), Max: 99.2 (09-28-17 @ 09:19)  HR: 92 (09-28-17 @ 09:19) (92 - 92)  BP: 136/74 (09-28-17 @ 09:19) (136/74 - 136/74)  RR:  (18 - 18)  SpO2:  (98% - 98%)  Wt(kg): --  GENERAL: NAD, well-groomed, well-developed  EYES: No proptosis, no lid lag, anicteric  HEENT:  Atraumatic, Normocephalic, moist mucous membranes  THYROID: Normal size, no palpable nodules  RESPIRATORY: Clear to auscultation bilaterally; No rales, rhonchi, wheezing  CARDIOVASCULAR: Regular rate and rhythm; No murmurs; no peripheral edema  GI: Soft, nontender, non distended, normal bowel sounds  SKIN: Dry, intact, No rashes or lesions  MUSCULOSKELETAL: Full range of motion, normal strength  NEURO: sensation intact, extraocular movements intact, no tremor  PSYCH: Alert and oriented x 3, normal affect, normal mood  CUSHING'S SIGNS: no striae    CAPILLARY BLOOD GLUCOSE                            16.0   12.4  )-----------( 272      ( 28 Sep 2017 09:22 )             46.8       09-28    139  |  99  |  13  ----------------------------<  228<H>  4.3   |  22  |  0.96    EGFR if : 98  EGFR if non : 85    Ca    9.5      09-28    TPro  7.3  /  Alb  4.2  /  TBili  1.3<H>  /  DBili  0.4<H>  /  AST  20  /  ALT  20  /  AlkPhos  84  09-28      Thyroid Function Tests:                Radiology: HPI:  This is a 61 y/o male PMH DM 1 managed by Dr. Lewis, HTN, RBBB presented to cardiology, Dr. Condon, with complaints of nonradiating substernal chest pain that occurs during exercise and relieved with rest. Patient now here for cardiac cath. Underwent cath revealing three vessel disease, may have CABG performed as inpatient.    Patient states that he was diagnosed with DM1 when he was 17 years old. Currently on lantus 60 units qhs. Takes 6-12 units of Novolog before meals. If pre-meal , takes 6 units of Novolog. If pre-meal -190, will take 10-12 units of Novolog. Reports AM fasting 's-140's. Bedtime FS generally 140's - 150's. Checks FS 5-6 times a day.     Diet:  Breakfast: oatmeal  Lunch: chicken with salad  Dinner: meat, salad.  Does not drink juice, has occasional diet soda.    Saw optho 11 months ago, no retinopathy. No history of neuropathy or nephropathy. Last HbA1c 7.7 in August 2017.      PAST MEDICAL & SURGICAL HISTORY:  RBBB  Diabetes mellitus  Chest pain  Hypertension  History of shoulder surgery  History of knee surgery      FAMILY HISTORY:  CAD (coronary artery disease)  DM1 in cousin      Social History:  No cigarette or alcohol use  Lives alone  Works an an     Outpatient Medications:  · 	aspirin 81 mg oral tablet: Last Dose Taken:  , 1 tab(s) orally once a day  · 	Lantus 100 units/mL subcutaneous solution: Last Dose Taken:  , 60 unit(s) subcutaneous once a day AM  	(pt. took 15units this morning 9/28/17 insturcted to take remainder after procedure)  · 	NovoLOG 100 units/mL subcutaneous solution: Last Dose Taken:  , 8 unit(s) subcutaneous 3 times a day  · 	losartan 50 mg oral tablet: Last Dose Taken:  , 1 tab(s) orally once a day    MEDICATIONS  (STANDING):  aspirin enteric coated 81 milliGRAM(s) Oral daily  insulin glargine Injectable (LANTUS) 33 Unit(s) SubCutaneous once  insulin lispro Injectable (HumaLOG) 6 Unit(s) SubCutaneous three times a day before meals  insulin lispro (HumaLOG) corrective regimen sliding scale   SubCutaneous three times a day before meals  insulin lispro (HumaLOG) corrective regimen sliding scale   SubCutaneous at bedtime  dextrose 5%. 1000 milliLiter(s) (50 mL/Hr) IV Continuous <Continuous>  dextrose 50% Injectable 12.5 Gram(s) IV Push once  dextrose 50% Injectable 25 Gram(s) IV Push once  dextrose 50% Injectable 25 Gram(s) IV Push once  chlorhexidine 4% Liquid 1 Application(s) Topical once  chlorhexidine 0.12% Liquid 30 milliLiter(s) Swish and Spit once  cefuroxime  IVPB 1500 milliGRAM(s) IV Intermittent once    MEDICATIONS  (PRN):  dextrose Gel 1 Dose(s) Oral once PRN Blood Glucose LESS THAN 70 milliGRAM(s)/deciliter  glucagon  Injectable 1 milliGRAM(s) IntraMuscular once PRN Glucose LESS THAN 70 milligrams/deciliter      Allergies  No Known Allergies        Review of Systems:  Constitutional: No fever  Eyes: No blurry vision  Neuro: No tremors  HEENT: No pain  Cardiovascular: No chest pain, palpitations  Respiratory: No SOB, no cough  GI: No nausea, vomiting, abdominal pain  : No dysuria  Skin: no rash  Endocrine: no polyuria, polydipsia  Hem/lymph: no swelling    ALL OTHER SYSTEMS REVIEWED AND NEGATIVE    PHYSICAL EXAM:  VITALS: T(C): 37.3 (09-28-17 @ 09:19)  T(F): 99.2 (09-28-17 @ 09:19), Max: 99.2 (09-28-17 @ 09:19)  HR: 92 (09-28-17 @ 09:19) (92 - 92)  BP: 136/74 (09-28-17 @ 09:19) (136/74 - 136/74)  RR:  (18 - 18)  SpO2:  (98% - 98%)  Wt(kg): --  GENERAL: NAD, well-groomed, well-developed  EYES: No proptosis, anicteric  HEENT:  Atraumatic, Normocephalic, moist mucous membranes  THYROID: Normal size, no palpable nodules  RESPIRATORY: Clear to auscultation bilaterally; No rales, rhonchi, wheezing  CARDIOVASCULAR: Regular rate and rhythm; No murmurs; no peripheral edema  GI: Soft, nontender, non distended, normal bowel sounds  SKIN: Dry, intact  MUSCULOSKELETAL: Full range of motion, normal strength  PSYCH: Alert and oriented x 3, reactive affect, euthymic mood      CAPILLARY BLOOD GLUCOSE                            16.0   12.4  )-----------( 272      ( 28 Sep 2017 09:22 )             46.8       09-28    139  |  99  |  13  ----------------------------<  228<H>  4.3   |  22  |  0.96    EGFR if : 98  EGFR if non : 85    Ca    9.5      09-28    TPro  7.3  /  Alb  4.2  /  TBili  1.3<H>  /  DBili  0.4<H>  /  AST  20  /  ALT  20  /  AlkPhos  84  09-28      Thyroid Function Tests:                Radiology: HPI:  This is a 63 y/o male PMH DM 1 managed by Dr. Lewis, HTN, RBBB presented to cardiology, Dr. Condon, with complaints of nonradiating substernal chest pain that occurs during exercise and relieved with rest. Patient now here for cardiac cath. Underwent cath revealing three vessel disease, to undergo CABG in AM.    Patient states that he was diagnosed with DM1 when he was 17 years old. Currently on lantus 60 units qAM. Takes 6-12 units of Novolog before meals. If pre-meal , takes 6 units of Novolog. If pre-meal -190, will take 10-12 units of Novolog. Reports AM fasting 's-140's. Bedtime FS generally 140's - 150's. Checks FS 5-6 times a day.     Diet:  Breakfast: oatmeal  Lunch: chicken with salad  Dinner: meat, salad.  Does not drink juice, has occasional diet soda.    Saw optho 11 months ago, no retinopathy. No history of neuropathy or nephropathy. Last HbA1c 7.7 in August 2017. Repeat HbA1c here 7.5.      PAST MEDICAL & SURGICAL HISTORY:  RBBB  Diabetes mellitus  Chest pain  Hypertension  History of shoulder surgery  History of knee surgery      FAMILY HISTORY:  CAD (coronary artery disease)  DM1 in cousin      Social History:  No cigarette or alcohol use  Lives alone  Works an an     Outpatient Medications:  · 	aspirin 81 mg oral tablet: Last Dose Taken:  , 1 tab(s) orally once a day  · 	Lantus 100 units/mL subcutaneous solution: Last Dose Taken:  , 60 unit(s) subcutaneous once a day AM  	(pt. took 15units this morning 9/28/17 insturcted to take remainder after procedure)  · 	NovoLOG 100 units/mL subcutaneous solution: Last Dose Taken:  , 8 unit(s) subcutaneous 3 times a day  · 	losartan 50 mg oral tablet: Last Dose Taken:  , 1 tab(s) orally once a day    MEDICATIONS  (STANDING):  aspirin enteric coated 81 milliGRAM(s) Oral daily  insulin glargine Injectable (LANTUS) 33 Unit(s) SubCutaneous once  insulin lispro Injectable (HumaLOG) 6 Unit(s) SubCutaneous three times a day before meals  insulin lispro (HumaLOG) corrective regimen sliding scale   SubCutaneous three times a day before meals  insulin lispro (HumaLOG) corrective regimen sliding scale   SubCutaneous at bedtime  dextrose 5%. 1000 milliLiter(s) (50 mL/Hr) IV Continuous <Continuous>  dextrose 50% Injectable 12.5 Gram(s) IV Push once  dextrose 50% Injectable 25 Gram(s) IV Push once  dextrose 50% Injectable 25 Gram(s) IV Push once  chlorhexidine 4% Liquid 1 Application(s) Topical once  chlorhexidine 0.12% Liquid 30 milliLiter(s) Swish and Spit once  cefuroxime  IVPB 1500 milliGRAM(s) IV Intermittent once    MEDICATIONS  (PRN):  dextrose Gel 1 Dose(s) Oral once PRN Blood Glucose LESS THAN 70 milliGRAM(s)/deciliter  glucagon  Injectable 1 milliGRAM(s) IntraMuscular once PRN Glucose LESS THAN 70 milligrams/deciliter      Allergies  No Known Allergies    Review of Systems:  Constitutional: No fever  Eyes: No blurry vision  Neuro: No tremors  HEENT: No pain  Cardiovascular: No chest pain, palpitations  Respiratory: No SOB, no cough  GI: No nausea, vomiting, abdominal pain  : No dysuria  Skin: no rash  Endocrine: no polyuria, polydipsia  Hem/lymph: no swelling    ALL OTHER SYSTEMS REVIEWED AND NEGATIVE    PHYSICAL EXAM:  VITALS: T(C): 37.3 (09-28-17 @ 09:19)  T(F): 99.2 (09-28-17 @ 09:19), Max: 99.2 (09-28-17 @ 09:19)  HR: 92 (09-28-17 @ 09:19) (92 - 92)  BP: 136/74 (09-28-17 @ 09:19) (136/74 - 136/74)  RR:  (18 - 18)  SpO2:  (98% - 98%)  Wt(kg): --  GENERAL: NAD, well-groomed, well-developed  EYES: No proptosis, anicteric  HEENT:  Atraumatic, Normocephalic, moist mucous membranes  THYROID: Normal size, no palpable nodules  RESPIRATORY: Clear to auscultation bilaterally; No rales, rhonchi, wheezing  CARDIOVASCULAR: Regular rate and rhythm; No murmurs; no peripheral edema  GI: Soft, nontender, non distended, normal bowel sounds  SKIN: Dry, intact  MUSCULOSKELETAL: Full range of motion, normal strength  PSYCH: Alert and oriented x 3, reactive affect, euthymic mood      CAPILLARY BLOOD GLUCOSE                            16.0   12.4  )-----------( 272      ( 28 Sep 2017 09:22 )             46.8       09-28    139  |  99  |  13  ----------------------------<  228<H>  4.3   |  22  |  0.96    EGFR if : 98  EGFR if non : 85    Ca    9.5      09-28    TPro  7.3  /  Alb  4.2  /  TBili  1.3<H>  /  DBili  0.4<H>  /  AST  20  /  ALT  20  /  AlkPhos  84  09-28      Thyroid Function Tests:                Radiology:

## 2017-09-28 NOTE — CONSULT NOTE ADULT - ATTENDING COMMENTS
Agree with assessment and plan as above by Dr. Yuan. Reviewed all pertinent labs, glucose values, and imaging studies. Modifications made as indicated above.     Ed Kelly D.O  [phone]

## 2017-09-29 ENCOUNTER — APPOINTMENT (OUTPATIENT)
Dept: CARDIOTHORACIC SURGERY | Facility: HOSPITAL | Age: 62
End: 2017-09-29
Payer: COMMERCIAL

## 2017-09-29 ENCOUNTER — RX RENEWAL (OUTPATIENT)
Age: 62
End: 2017-09-29

## 2017-09-29 PROBLEM — Z00.00 ENCOUNTER FOR PREVENTIVE HEALTH EXAMINATION: Noted: 2017-09-29

## 2017-09-29 LAB
ANION GAP SERPL CALC-SCNC: 14 MMOL/L — SIGNIFICANT CHANGE UP (ref 5–17)
ANION GAP SERPL CALC-SCNC: 16 MMOL/L — SIGNIFICANT CHANGE UP (ref 5–17)
APTT BLD: 28.5 SEC — SIGNIFICANT CHANGE UP (ref 27.5–37.4)
APTT BLD: 50.8 SEC — HIGH (ref 27.5–37.4)
BASE EXCESS BLDMV CALC-SCNC: -2.2 MMOL/L — SIGNIFICANT CHANGE UP (ref -3–3)
BASE EXCESS BLDMV CALC-SCNC: -2.6 MMOL/L — SIGNIFICANT CHANGE UP (ref -3–3)
BASE EXCESS BLDMV CALC-SCNC: -2.6 MMOL/L — SIGNIFICANT CHANGE UP (ref -3–3)
BASE EXCESS BLDMV CALC-SCNC: -3.2 MMOL/L — LOW (ref -3–3)
BASOPHILS # BLD AUTO: 0 K/UL — SIGNIFICANT CHANGE UP (ref 0–0.2)
BASOPHILS NFR BLD AUTO: 0.1 % — SIGNIFICANT CHANGE UP (ref 0–2)
BUN SERPL-MCNC: 13 MG/DL — SIGNIFICANT CHANGE UP (ref 7–23)
BUN SERPL-MCNC: 15 MG/DL — SIGNIFICANT CHANGE UP (ref 7–23)
CALCIUM SERPL-MCNC: 8 MG/DL — LOW (ref 8.4–10.5)
CALCIUM SERPL-MCNC: 9.2 MG/DL — SIGNIFICANT CHANGE UP (ref 8.4–10.5)
CHLORIDE SERPL-SCNC: 101 MMOL/L — SIGNIFICANT CHANGE UP (ref 96–108)
CHLORIDE SERPL-SCNC: 104 MMOL/L — SIGNIFICANT CHANGE UP (ref 96–108)
CK MB BLD-MCNC: 8.7 % — HIGH (ref 0–3.5)
CK MB CFR SERPL CALC: 19.1 NG/ML — HIGH (ref 0–6.7)
CK SERPL-CCNC: 219 U/L — HIGH (ref 30–200)
CO2 BLDMV-SCNC: 23 MMOL/L — SIGNIFICANT CHANGE UP (ref 21–29)
CO2 BLDMV-SCNC: 24 MMOL/L — SIGNIFICANT CHANGE UP (ref 21–29)
CO2 SERPL-SCNC: 21 MMOL/L — LOW (ref 22–31)
CO2 SERPL-SCNC: 24 MMOL/L — SIGNIFICANT CHANGE UP (ref 22–31)
CREAT SERPL-MCNC: 0.74 MG/DL — SIGNIFICANT CHANGE UP (ref 0.5–1.3)
CREAT SERPL-MCNC: 0.91 MG/DL — SIGNIFICANT CHANGE UP (ref 0.5–1.3)
EOSINOPHIL # BLD AUTO: 0.1 K/UL — SIGNIFICANT CHANGE UP (ref 0–0.5)
EOSINOPHIL NFR BLD AUTO: 0.5 % — SIGNIFICANT CHANGE UP (ref 0–6)
GAS PNL BLDA: SIGNIFICANT CHANGE UP
GAS PNL BLDMV: SIGNIFICANT CHANGE UP
GLUCOSE SERPL-MCNC: 108 MG/DL — HIGH (ref 70–99)
GLUCOSE SERPL-MCNC: 147 MG/DL — HIGH (ref 70–99)
HBA1C BLD-MCNC: 7.4 % — HIGH (ref 4–5.6)
HCO3 BLDMV-SCNC: 22 MMOL/L — SIGNIFICANT CHANGE UP (ref 20–28)
HCO3 BLDMV-SCNC: 23 MMOL/L — SIGNIFICANT CHANGE UP (ref 20–28)
HCT VFR BLD CALC: 37.8 % — LOW (ref 39–50)
HCT VFR BLD CALC: 40.9 % — SIGNIFICANT CHANGE UP (ref 39–50)
HCT VFR BLD CALC: 42.2 % — SIGNIFICANT CHANGE UP (ref 39–50)
HGB BLD-MCNC: 13.1 G/DL — SIGNIFICANT CHANGE UP (ref 13–17)
HGB BLD-MCNC: 13.8 G/DL — SIGNIFICANT CHANGE UP (ref 13–17)
HGB BLD-MCNC: 14.8 G/DL — SIGNIFICANT CHANGE UP (ref 13–17)
HOROWITZ INDEX BLDMV+IHG-RTO: 100 — SIGNIFICANT CHANGE UP
HOROWITZ INDEX BLDMV+IHG-RTO: 40 — SIGNIFICANT CHANGE UP
HOROWITZ INDEX BLDMV+IHG-RTO: 40 — SIGNIFICANT CHANGE UP
HOROWITZ INDEX BLDMV+IHG-RTO: 50 — SIGNIFICANT CHANGE UP
INR BLD: 1.3 RATIO — HIGH (ref 0.88–1.16)
LYMPHOCYTES # BLD AUTO: 21 % — SIGNIFICANT CHANGE UP (ref 13–44)
LYMPHOCYTES # BLD AUTO: 3.9 K/UL — HIGH (ref 1–3.3)
MCHC RBC-ENTMCNC: 29.3 PG — SIGNIFICANT CHANGE UP (ref 27–34)
MCHC RBC-ENTMCNC: 31.4 PG — SIGNIFICANT CHANGE UP (ref 27–34)
MCHC RBC-ENTMCNC: 31.9 PG — SIGNIFICANT CHANGE UP (ref 27–34)
MCHC RBC-ENTMCNC: 33.7 GM/DL — SIGNIFICANT CHANGE UP (ref 32–36)
MCHC RBC-ENTMCNC: 34.7 GM/DL — SIGNIFICANT CHANGE UP (ref 32–36)
MCHC RBC-ENTMCNC: 35.2 GM/DL — SIGNIFICANT CHANGE UP (ref 32–36)
MCV RBC AUTO: 86.8 FL — SIGNIFICANT CHANGE UP (ref 80–100)
MCV RBC AUTO: 90.5 FL — SIGNIFICANT CHANGE UP (ref 80–100)
MCV RBC AUTO: 90.8 FL — SIGNIFICANT CHANGE UP (ref 80–100)
MONOCYTES # BLD AUTO: 0.8 K/UL — SIGNIFICANT CHANGE UP (ref 0–0.9)
MONOCYTES NFR BLD AUTO: 4.3 % — SIGNIFICANT CHANGE UP (ref 2–14)
MRSA PCR RESULT.: SIGNIFICANT CHANGE UP
NEUTROPHILS # BLD AUTO: 13.9 K/UL — HIGH (ref 1.8–7.4)
NEUTROPHILS NFR BLD AUTO: 74.1 % — SIGNIFICANT CHANGE UP (ref 43–77)
O2 CT VFR BLD CALC: 33 MMHG — SIGNIFICANT CHANGE UP (ref 30–65)
O2 CT VFR BLD CALC: 36 MMHG — SIGNIFICANT CHANGE UP (ref 30–65)
O2 CT VFR BLD CALC: 36 MMHG — SIGNIFICANT CHANGE UP (ref 30–65)
O2 CT VFR BLD CALC: 52 MMHG — SIGNIFICANT CHANGE UP (ref 30–65)
PCO2 BLDMV: 40 MMHG — SIGNIFICANT CHANGE UP (ref 30–65)
PCO2 BLDMV: 43 MMHG — SIGNIFICANT CHANGE UP (ref 30–65)
PCO2 BLDMV: 44 MMHG — SIGNIFICANT CHANGE UP (ref 30–65)
PCO2 BLDMV: 44 MMHG — SIGNIFICANT CHANGE UP (ref 30–65)
PH BLDMV: 7.32 — SIGNIFICANT CHANGE UP (ref 7.32–7.45)
PH BLDMV: 7.33 — SIGNIFICANT CHANGE UP (ref 7.32–7.45)
PH BLDMV: 7.34 — SIGNIFICANT CHANGE UP (ref 7.32–7.45)
PH BLDMV: 7.37 — SIGNIFICANT CHANGE UP (ref 7.32–7.45)
PLATELET # BLD AUTO: 203 K/UL — SIGNIFICANT CHANGE UP (ref 150–400)
PLATELET # BLD AUTO: 234 K/UL — SIGNIFICANT CHANGE UP (ref 150–400)
PLATELET # BLD AUTO: 244 K/UL — SIGNIFICANT CHANGE UP (ref 150–400)
POTASSIUM SERPL-MCNC: 4 MMOL/L — SIGNIFICANT CHANGE UP (ref 3.5–5.3)
POTASSIUM SERPL-MCNC: 4.3 MMOL/L — SIGNIFICANT CHANGE UP (ref 3.5–5.3)
POTASSIUM SERPL-SCNC: 4 MMOL/L — SIGNIFICANT CHANGE UP (ref 3.5–5.3)
POTASSIUM SERPL-SCNC: 4.3 MMOL/L — SIGNIFICANT CHANGE UP (ref 3.5–5.3)
PROTHROM AB SERPL-ACNC: 14.1 SEC — HIGH (ref 9.8–12.7)
RBC # BLD: 4.18 M/UL — LOW (ref 4.2–5.8)
RBC # BLD: 4.64 M/UL — SIGNIFICANT CHANGE UP (ref 4.2–5.8)
RBC # BLD: 4.71 M/UL — SIGNIFICANT CHANGE UP (ref 4.2–5.8)
RBC # FLD: 11.1 % — SIGNIFICANT CHANGE UP (ref 10.3–14.5)
RBC # FLD: 11.3 % — SIGNIFICANT CHANGE UP (ref 10.3–14.5)
RBC # FLD: 12.2 % — SIGNIFICANT CHANGE UP (ref 10.3–14.5)
S AUREUS DNA NOSE QL NAA+PROBE: SIGNIFICANT CHANGE UP
SAO2 % BLDMV: 56 % — LOW (ref 60–90)
SAO2 % BLDMV: 61 % — SIGNIFICANT CHANGE UP (ref 60–90)
SAO2 % BLDMV: 63 % — SIGNIFICANT CHANGE UP (ref 60–90)
SAO2 % BLDMV: 81 % — SIGNIFICANT CHANGE UP (ref 60–90)
SODIUM SERPL-SCNC: 139 MMOL/L — SIGNIFICANT CHANGE UP (ref 135–145)
SODIUM SERPL-SCNC: 141 MMOL/L — SIGNIFICANT CHANGE UP (ref 135–145)
TROPONIN T SERPL-MCNC: 1.2 NG/ML — HIGH (ref 0–0.06)
TSH RECEP AB FLD-ACNC: <0.5 IU/L — SIGNIFICANT CHANGE UP (ref 0–1.75)
WBC # BLD: 18.8 K/UL — HIGH (ref 3.8–10.5)
WBC # BLD: 8.03 K/UL — SIGNIFICANT CHANGE UP (ref 3.8–10.5)
WBC # BLD: 8.4 K/UL — SIGNIFICANT CHANGE UP (ref 3.8–10.5)
WBC # FLD AUTO: 18.8 K/UL — HIGH (ref 3.8–10.5)
WBC # FLD AUTO: 8.03 K/UL — SIGNIFICANT CHANGE UP (ref 3.8–10.5)
WBC # FLD AUTO: 8.4 K/UL — SIGNIFICANT CHANGE UP (ref 3.8–10.5)

## 2017-09-29 PROCEDURE — 33519 CABG ARTERY-VEIN THREE: CPT

## 2017-09-29 PROCEDURE — 71010: CPT | Mod: 26

## 2017-09-29 PROCEDURE — 93010 ELECTROCARDIOGRAM REPORT: CPT

## 2017-09-29 PROCEDURE — 33519 CABG ARTERY-VEIN THREE: CPT | Mod: AS

## 2017-09-29 PROCEDURE — 33533 CABG ARTERIAL SINGLE: CPT | Mod: AS

## 2017-09-29 PROCEDURE — 33533 CABG ARTERIAL SINGLE: CPT

## 2017-09-29 RX ORDER — INSULIN HUMAN 100 [IU]/ML
3 INJECTION, SOLUTION SUBCUTANEOUS
Qty: 100 | Refills: 0 | Status: DISCONTINUED | OUTPATIENT
Start: 2017-09-29 | End: 2017-10-02

## 2017-09-29 RX ORDER — CALCIUM GLUCONATE 100 MG/ML
1 VIAL (ML) INTRAVENOUS ONCE
Qty: 0 | Refills: 0 | Status: COMPLETED | OUTPATIENT
Start: 2017-09-29 | End: 2017-09-29

## 2017-09-29 RX ORDER — METOCLOPRAMIDE HCL 10 MG
10 TABLET ORAL EVERY 8 HOURS
Qty: 0 | Refills: 0 | Status: DISCONTINUED | OUTPATIENT
Start: 2017-09-29 | End: 2017-09-30

## 2017-09-29 RX ORDER — VASOPRESSIN 20 [USP'U]/ML
0.03 INJECTION INTRAVENOUS
Qty: 100 | Refills: 0 | Status: DISCONTINUED | OUTPATIENT
Start: 2017-09-29 | End: 2017-10-01

## 2017-09-29 RX ORDER — NOREPINEPHRINE BITARTRATE/D5W 8 MG/250ML
0.02 PLASTIC BAG, INJECTION (ML) INTRAVENOUS
Qty: 8 | Refills: 0 | Status: DISCONTINUED | OUTPATIENT
Start: 2017-09-29 | End: 2017-09-30

## 2017-09-29 RX ORDER — CEFUROXIME AXETIL 250 MG
1500 TABLET ORAL EVERY 8 HOURS
Qty: 0 | Refills: 0 | Status: COMPLETED | OUTPATIENT
Start: 2017-09-29 | End: 2017-09-30

## 2017-09-29 RX ORDER — DOCUSATE SODIUM 100 MG
100 CAPSULE ORAL THREE TIMES A DAY
Qty: 0 | Refills: 0 | Status: DISCONTINUED | OUTPATIENT
Start: 2017-09-29 | End: 2017-10-04

## 2017-09-29 RX ORDER — SODIUM CHLORIDE 9 MG/ML
1000 INJECTION, SOLUTION INTRAVENOUS
Qty: 0 | Refills: 0 | Status: DISCONTINUED | OUTPATIENT
Start: 2017-09-29 | End: 2017-09-30

## 2017-09-29 RX ORDER — PANTOPRAZOLE SODIUM 20 MG/1
40 TABLET, DELAYED RELEASE ORAL DAILY
Qty: 0 | Refills: 0 | Status: DISCONTINUED | OUTPATIENT
Start: 2017-09-29 | End: 2017-09-30

## 2017-09-29 RX ORDER — SODIUM CHLORIDE 9 MG/ML
500 INJECTION INTRAMUSCULAR; INTRAVENOUS; SUBCUTANEOUS ONCE
Qty: 0 | Refills: 0 | Status: COMPLETED | OUTPATIENT
Start: 2017-09-29 | End: 2017-09-29

## 2017-09-29 RX ORDER — DOBUTAMINE HCL 250MG/20ML
1.25 VIAL (ML) INTRAVENOUS
Qty: 500 | Refills: 0 | Status: DISCONTINUED | OUTPATIENT
Start: 2017-09-29 | End: 2017-09-30

## 2017-09-29 RX ORDER — ALBUMIN HUMAN 25 %
250 VIAL (ML) INTRAVENOUS
Qty: 0 | Refills: 0 | Status: COMPLETED | OUTPATIENT
Start: 2017-09-29 | End: 2017-09-29

## 2017-09-29 RX ORDER — ASPIRIN/CALCIUM CARB/MAGNESIUM 324 MG
325 TABLET ORAL DAILY
Qty: 0 | Refills: 0 | Status: DISCONTINUED | OUTPATIENT
Start: 2017-09-29 | End: 2017-09-30

## 2017-09-29 RX ORDER — NICARDIPINE HYDROCHLORIDE 30 MG/1
3 CAPSULE, EXTENDED RELEASE ORAL
Qty: 40 | Refills: 0 | Status: DISCONTINUED | OUTPATIENT
Start: 2017-09-29 | End: 2017-09-29

## 2017-09-29 RX ORDER — MEPERIDINE HYDROCHLORIDE 50 MG/ML
25 INJECTION INTRAMUSCULAR; INTRAVENOUS; SUBCUTANEOUS ONCE
Qty: 0 | Refills: 0 | Status: DISCONTINUED | OUTPATIENT
Start: 2017-09-29 | End: 2017-09-29

## 2017-09-29 RX ADMIN — Medication 100 MILLIGRAM(S): at 18:00

## 2017-09-29 RX ADMIN — Medication 500 MILLILITER(S): at 22:00

## 2017-09-29 RX ADMIN — Medication 200 GRAM(S): at 21:59

## 2017-09-29 RX ADMIN — Medication 10 MILLIGRAM(S): at 21:26

## 2017-09-29 RX ADMIN — SODIUM CHLORIDE 3000 MILLILITER(S): 9 INJECTION INTRAMUSCULAR; INTRAVENOUS; SUBCUTANEOUS at 21:00

## 2017-09-29 RX ADMIN — Medication 500 MILLILITER(S): at 19:45

## 2017-09-29 RX ADMIN — PANTOPRAZOLE SODIUM 40 MILLIGRAM(S): 20 TABLET, DELAYED RELEASE ORAL at 22:25

## 2017-09-29 RX ADMIN — Medication 25 MILLIGRAM(S): at 05:41

## 2017-09-29 RX ADMIN — NICARDIPINE HYDROCHLORIDE 15 MG/HR: 30 CAPSULE, EXTENDED RELEASE ORAL at 19:15

## 2017-09-29 RX ADMIN — Medication 500 MILLILITER(S): at 19:59

## 2017-09-29 RX ADMIN — CHLORHEXIDINE GLUCONATE 30 MILLILITER(S): 213 SOLUTION TOPICAL at 11:30

## 2017-09-29 RX ADMIN — MEPERIDINE HYDROCHLORIDE 25 MILLIGRAM(S): 50 INJECTION INTRAMUSCULAR; INTRAVENOUS; SUBCUTANEOUS at 18:30

## 2017-09-29 RX ADMIN — Medication 500 MILLILITER(S): at 22:27

## 2017-09-29 NOTE — BRIEF OPERATIVE NOTE - PROCEDURE
<<-----Click on this checkbox to enter Procedure CABG  09/29/2017  CABGx4 (LIMA-LAD, SVG-diag, SVG-ramus, SVG-RCA)  [facility]  [facility]

## 2017-09-30 LAB
ALBUMIN SERPL ELPH-MCNC: 3.6 G/DL — SIGNIFICANT CHANGE UP (ref 3.3–5)
ALP SERPL-CCNC: 40 U/L — SIGNIFICANT CHANGE UP (ref 40–120)
ALT FLD-CCNC: 13 U/L RC — SIGNIFICANT CHANGE UP (ref 10–45)
ANION GAP SERPL CALC-SCNC: 11 MMOL/L — SIGNIFICANT CHANGE UP (ref 5–17)
APTT BLD: 29.6 SEC — SIGNIFICANT CHANGE UP (ref 27.5–37.4)
AST SERPL-CCNC: 27 U/L — SIGNIFICANT CHANGE UP (ref 10–40)
BASE EXCESS BLDMV CALC-SCNC: -1.5 MMOL/L — SIGNIFICANT CHANGE UP (ref -3–3)
BILIRUB SERPL-MCNC: 0.9 MG/DL — SIGNIFICANT CHANGE UP (ref 0.2–1.2)
BUN SERPL-MCNC: 11 MG/DL — SIGNIFICANT CHANGE UP (ref 7–23)
CALCIUM SERPL-MCNC: 7.5 MG/DL — LOW (ref 8.4–10.5)
CHLORIDE SERPL-SCNC: 107 MMOL/L — SIGNIFICANT CHANGE UP (ref 96–108)
CK MB BLD-MCNC: 4.8 % — HIGH (ref 0–3.5)
CK MB CFR SERPL CALC: 18.6 NG/ML — HIGH (ref 0–6.7)
CK SERPL-CCNC: 384 U/L — HIGH (ref 30–200)
CO2 BLDMV-SCNC: 24 MMOL/L — SIGNIFICANT CHANGE UP (ref 21–29)
CO2 SERPL-SCNC: 23 MMOL/L — SIGNIFICANT CHANGE UP (ref 22–31)
CREAT SERPL-MCNC: 0.8 MG/DL — SIGNIFICANT CHANGE UP (ref 0.5–1.3)
GAS PNL BLDA: SIGNIFICANT CHANGE UP
GAS PNL BLDMV: SIGNIFICANT CHANGE UP
GLUCOSE SERPL-MCNC: 100 MG/DL — HIGH (ref 70–99)
HCO3 BLDMV-SCNC: 23 MMOL/L — SIGNIFICANT CHANGE UP (ref 20–28)
HCT VFR BLD CALC: 28.4 % — LOW (ref 39–50)
HGB BLD-MCNC: 10.1 G/DL — LOW (ref 13–17)
HOROWITZ INDEX BLDMV+IHG-RTO: 28 — SIGNIFICANT CHANGE UP
INR BLD: 1.26 RATIO — HIGH (ref 0.88–1.16)
MCHC RBC-ENTMCNC: 32.1 PG — SIGNIFICANT CHANGE UP (ref 27–34)
MCHC RBC-ENTMCNC: 35.7 GM/DL — SIGNIFICANT CHANGE UP (ref 32–36)
MCV RBC AUTO: 90 FL — SIGNIFICANT CHANGE UP (ref 80–100)
O2 CT VFR BLD CALC: 35 MMHG — SIGNIFICANT CHANGE UP (ref 30–65)
PCO2 BLDMV: 40 MMHG — SIGNIFICANT CHANGE UP (ref 30–65)
PH BLDMV: 7.38 — SIGNIFICANT CHANGE UP (ref 7.32–7.45)
PHOSPHATE SERPL-MCNC: 2.6 MG/DL — SIGNIFICANT CHANGE UP (ref 2.5–4.5)
PLATELET # BLD AUTO: 146 K/UL — LOW (ref 150–400)
POTASSIUM SERPL-MCNC: 4.4 MMOL/L — SIGNIFICANT CHANGE UP (ref 3.5–5.3)
POTASSIUM SERPL-SCNC: 4.4 MMOL/L — SIGNIFICANT CHANGE UP (ref 3.5–5.3)
PROT SERPL-MCNC: 5 G/DL — LOW (ref 6–8.3)
PROTHROM AB SERPL-ACNC: 13.7 SEC — HIGH (ref 9.8–12.7)
RBC # BLD: 3.16 M/UL — LOW (ref 4.2–5.8)
RBC # FLD: 11 % — SIGNIFICANT CHANGE UP (ref 10.3–14.5)
SAO2 % BLDMV: 57 % — LOW (ref 60–90)
SODIUM SERPL-SCNC: 141 MMOL/L — SIGNIFICANT CHANGE UP (ref 135–145)
TROPONIN T SERPL-MCNC: 1.56 NG/ML — HIGH (ref 0–0.06)
WBC # BLD: 7.9 K/UL — SIGNIFICANT CHANGE UP (ref 3.8–10.5)
WBC # FLD AUTO: 7.9 K/UL — SIGNIFICANT CHANGE UP (ref 3.8–10.5)

## 2017-09-30 PROCEDURE — 99291 CRITICAL CARE FIRST HOUR: CPT

## 2017-09-30 PROCEDURE — 71010: CPT | Mod: 26

## 2017-09-30 PROCEDURE — 99292 CRITICAL CARE ADDL 30 MIN: CPT

## 2017-09-30 PROCEDURE — 93010 ELECTROCARDIOGRAM REPORT: CPT

## 2017-09-30 RX ORDER — INSULIN LISPRO 100/ML
VIAL (ML) SUBCUTANEOUS
Qty: 0 | Refills: 0 | Status: DISCONTINUED | OUTPATIENT
Start: 2017-09-30 | End: 2017-10-02

## 2017-09-30 RX ORDER — DEXTROSE 50 % IN WATER 50 %
1 SYRINGE (ML) INTRAVENOUS ONCE
Qty: 0 | Refills: 0 | Status: DISCONTINUED | OUTPATIENT
Start: 2017-09-30 | End: 2017-10-04

## 2017-09-30 RX ORDER — POTASSIUM CHLORIDE 20 MEQ
10 PACKET (EA) ORAL
Qty: 0 | Refills: 0 | Status: COMPLETED | OUTPATIENT
Start: 2017-09-30 | End: 2017-09-30

## 2017-09-30 RX ORDER — NOREPINEPHRINE BITARTRATE/D5W 8 MG/250ML
0.03 PLASTIC BAG, INJECTION (ML) INTRAVENOUS
Qty: 8 | Refills: 0 | Status: DISCONTINUED | OUTPATIENT
Start: 2017-09-30 | End: 2017-10-01

## 2017-09-30 RX ORDER — PANTOPRAZOLE SODIUM 20 MG/1
40 TABLET, DELAYED RELEASE ORAL
Qty: 0 | Refills: 0 | Status: DISCONTINUED | OUTPATIENT
Start: 2017-09-30 | End: 2017-10-04

## 2017-09-30 RX ORDER — DEXTROSE 50 % IN WATER 50 %
12.5 SYRINGE (ML) INTRAVENOUS ONCE
Qty: 0 | Refills: 0 | Status: DISCONTINUED | OUTPATIENT
Start: 2017-09-30 | End: 2017-10-04

## 2017-09-30 RX ORDER — OXYCODONE AND ACETAMINOPHEN 5; 325 MG/1; MG/1
1 TABLET ORAL EVERY 6 HOURS
Qty: 0 | Refills: 0 | Status: DISCONTINUED | OUTPATIENT
Start: 2017-09-30 | End: 2017-10-04

## 2017-09-30 RX ORDER — ACETAMINOPHEN 500 MG
1000 TABLET ORAL ONCE
Qty: 0 | Refills: 0 | Status: COMPLETED | OUTPATIENT
Start: 2017-09-30 | End: 2017-09-30

## 2017-09-30 RX ORDER — HYDROMORPHONE HYDROCHLORIDE 2 MG/ML
0.5 INJECTION INTRAMUSCULAR; INTRAVENOUS; SUBCUTANEOUS ONCE
Qty: 0 | Refills: 0 | Status: DISCONTINUED | OUTPATIENT
Start: 2017-09-30 | End: 2017-09-30

## 2017-09-30 RX ORDER — POTASSIUM CHLORIDE 20 MEQ
10 PACKET (EA) ORAL ONCE
Qty: 0 | Refills: 0 | Status: COMPLETED | OUTPATIENT
Start: 2017-09-30 | End: 2017-09-30

## 2017-09-30 RX ORDER — ASPIRIN/CALCIUM CARB/MAGNESIUM 324 MG
325 TABLET ORAL DAILY
Qty: 0 | Refills: 0 | Status: DISCONTINUED | OUTPATIENT
Start: 2017-09-30 | End: 2017-10-04

## 2017-09-30 RX ORDER — INSULIN HUMAN 100 [IU]/ML
8 INJECTION, SOLUTION SUBCUTANEOUS
Qty: 0 | Refills: 0 | Status: DISCONTINUED | OUTPATIENT
Start: 2017-09-30 | End: 2017-10-01

## 2017-09-30 RX ORDER — GLUCAGON INJECTION, SOLUTION 0.5 MG/.1ML
1 INJECTION, SOLUTION SUBCUTANEOUS ONCE
Qty: 0 | Refills: 0 | Status: DISCONTINUED | OUTPATIENT
Start: 2017-09-30 | End: 2017-10-04

## 2017-09-30 RX ORDER — INSULIN LISPRO 100/ML
VIAL (ML) SUBCUTANEOUS AT BEDTIME
Qty: 0 | Refills: 0 | Status: DISCONTINUED | OUTPATIENT
Start: 2017-09-30 | End: 2017-10-04

## 2017-09-30 RX ORDER — INSULIN GLARGINE 100 [IU]/ML
40 INJECTION, SOLUTION SUBCUTANEOUS ONCE
Qty: 0 | Refills: 0 | Status: COMPLETED | OUTPATIENT
Start: 2017-09-30 | End: 2017-09-30

## 2017-09-30 RX ORDER — SODIUM CHLORIDE 9 MG/ML
1000 INJECTION, SOLUTION INTRAVENOUS
Qty: 0 | Refills: 0 | Status: DISCONTINUED | OUTPATIENT
Start: 2017-09-30 | End: 2017-10-04

## 2017-09-30 RX ORDER — OXYCODONE AND ACETAMINOPHEN 5; 325 MG/1; MG/1
2 TABLET ORAL EVERY 6 HOURS
Qty: 0 | Refills: 0 | Status: DISCONTINUED | OUTPATIENT
Start: 2017-09-30 | End: 2017-10-04

## 2017-09-30 RX ORDER — ENOXAPARIN SODIUM 100 MG/ML
40 INJECTION SUBCUTANEOUS DAILY
Qty: 0 | Refills: 0 | Status: DISCONTINUED | OUTPATIENT
Start: 2017-09-30 | End: 2017-10-04

## 2017-09-30 RX ORDER — ALBUMIN HUMAN 25 %
250 VIAL (ML) INTRAVENOUS ONCE
Qty: 0 | Refills: 0 | Status: COMPLETED | OUTPATIENT
Start: 2017-09-30 | End: 2017-09-30

## 2017-09-30 RX ORDER — MAGNESIUM SULFATE 500 MG/ML
2 VIAL (ML) INJECTION ONCE
Qty: 0 | Refills: 0 | Status: COMPLETED | OUTPATIENT
Start: 2017-09-30 | End: 2017-09-30

## 2017-09-30 RX ORDER — DEXTROSE 50 % IN WATER 50 %
25 SYRINGE (ML) INTRAVENOUS ONCE
Qty: 0 | Refills: 0 | Status: DISCONTINUED | OUTPATIENT
Start: 2017-09-30 | End: 2017-10-04

## 2017-09-30 RX ORDER — INSULIN GLARGINE 100 [IU]/ML
60 INJECTION, SOLUTION SUBCUTANEOUS EVERY MORNING
Qty: 0 | Refills: 0 | Status: DISCONTINUED | OUTPATIENT
Start: 2017-10-01 | End: 2017-10-04

## 2017-09-30 RX ORDER — ATORVASTATIN CALCIUM 80 MG/1
40 TABLET, FILM COATED ORAL AT BEDTIME
Qty: 0 | Refills: 0 | Status: DISCONTINUED | OUTPATIENT
Start: 2017-09-30 | End: 2017-10-04

## 2017-09-30 RX ADMIN — HYDROMORPHONE HYDROCHLORIDE 0.5 MILLIGRAM(S): 2 INJECTION INTRAMUSCULAR; INTRAVENOUS; SUBCUTANEOUS at 04:15

## 2017-09-30 RX ADMIN — INSULIN HUMAN 8 UNIT(S): 100 INJECTION, SOLUTION SUBCUTANEOUS at 17:08

## 2017-09-30 RX ADMIN — Medication 100 MILLIGRAM(S): at 13:59

## 2017-09-30 RX ADMIN — Medication 50 GRAM(S): at 02:00

## 2017-09-30 RX ADMIN — ENOXAPARIN SODIUM 40 MILLIGRAM(S): 100 INJECTION SUBCUTANEOUS at 12:39

## 2017-09-30 RX ADMIN — Medication 100 MILLIGRAM(S): at 21:12

## 2017-09-30 RX ADMIN — OXYCODONE AND ACETAMINOPHEN 2 TABLET(S): 5; 325 TABLET ORAL at 22:30

## 2017-09-30 RX ADMIN — HYDROMORPHONE HYDROCHLORIDE 0.5 MILLIGRAM(S): 2 INJECTION INTRAMUSCULAR; INTRAVENOUS; SUBCUTANEOUS at 22:00

## 2017-09-30 RX ADMIN — Medication 125 MILLILITER(S): at 10:48

## 2017-09-30 RX ADMIN — Medication 50 MILLIEQUIVALENT(S): at 20:30

## 2017-09-30 RX ADMIN — ATORVASTATIN CALCIUM 40 MILLIGRAM(S): 80 TABLET, FILM COATED ORAL at 21:12

## 2017-09-30 RX ADMIN — Medication 325 MILLIGRAM(S): at 01:23

## 2017-09-30 RX ADMIN — Medication 100 MILLIGRAM(S): at 07:39

## 2017-09-30 RX ADMIN — Medication 50 MILLIEQUIVALENT(S): at 21:13

## 2017-09-30 RX ADMIN — INSULIN HUMAN 8 UNIT(S): 100 INJECTION, SOLUTION SUBCUTANEOUS at 12:39

## 2017-09-30 RX ADMIN — Medication 50 MILLIEQUIVALENT(S): at 22:30

## 2017-09-30 RX ADMIN — OXYCODONE AND ACETAMINOPHEN 2 TABLET(S): 5; 325 TABLET ORAL at 14:45

## 2017-09-30 RX ADMIN — HYDROMORPHONE HYDROCHLORIDE 0.5 MILLIGRAM(S): 2 INJECTION INTRAMUSCULAR; INTRAVENOUS; SUBCUTANEOUS at 21:30

## 2017-09-30 RX ADMIN — OXYCODONE AND ACETAMINOPHEN 1 TABLET(S): 5; 325 TABLET ORAL at 17:41

## 2017-09-30 RX ADMIN — Medication 50 MILLIEQUIVALENT(S): at 17:58

## 2017-09-30 RX ADMIN — Medication 50 MILLIEQUIVALENT(S): at 17:08

## 2017-09-30 RX ADMIN — OXYCODONE AND ACETAMINOPHEN 2 TABLET(S): 5; 325 TABLET ORAL at 21:54

## 2017-09-30 RX ADMIN — OXYCODONE AND ACETAMINOPHEN 1 TABLET(S): 5; 325 TABLET ORAL at 10:47

## 2017-09-30 RX ADMIN — OXYCODONE AND ACETAMINOPHEN 2 TABLET(S): 5; 325 TABLET ORAL at 14:10

## 2017-09-30 RX ADMIN — Medication 1: at 17:08

## 2017-09-30 RX ADMIN — OXYCODONE AND ACETAMINOPHEN 2 TABLET(S): 5; 325 TABLET ORAL at 08:59

## 2017-09-30 RX ADMIN — OXYCODONE AND ACETAMINOPHEN 1 TABLET(S): 5; 325 TABLET ORAL at 11:18

## 2017-09-30 RX ADMIN — Medication 1000 MILLIGRAM(S): at 02:30

## 2017-09-30 RX ADMIN — OXYCODONE AND ACETAMINOPHEN 2 TABLET(S): 5; 325 TABLET ORAL at 08:18

## 2017-09-30 RX ADMIN — Medication 400 MILLIGRAM(S): at 02:00

## 2017-09-30 RX ADMIN — OXYCODONE AND ACETAMINOPHEN 1 TABLET(S): 5; 325 TABLET ORAL at 18:21

## 2017-09-30 RX ADMIN — HYDROMORPHONE HYDROCHLORIDE 0.5 MILLIGRAM(S): 2 INJECTION INTRAMUSCULAR; INTRAVENOUS; SUBCUTANEOUS at 04:30

## 2017-09-30 RX ADMIN — PANTOPRAZOLE SODIUM 40 MILLIGRAM(S): 20 TABLET, DELAYED RELEASE ORAL at 10:03

## 2017-09-30 RX ADMIN — INSULIN GLARGINE 40 UNIT(S): 100 INJECTION, SOLUTION SUBCUTANEOUS at 10:14

## 2017-09-30 RX ADMIN — Medication 100 MILLIGRAM(S): at 00:00

## 2017-09-30 NOTE — PROGRESS NOTE ADULT - SUBJECTIVE AND OBJECTIVE BOX
DMITRIY CABRERA   MRN#: [id number]     The patient is a 62y Male who was seen, evaluated, & examined with the CTICU staff on rounds with a multidisciplinary care plan formulated and implemented.  All available clinical, laboratory, radiographic, pharmacologic, and electrocardiographic data were reviewed & analyzed.  The patient was still in the CTICU in critical condition secondary to persistent cardiopulmonary dysfunction, hemodynamically significant hypovolemia-shock, and uncontrolled Type I Diabetes mellitus-hyperglycemia.      Respiratory status required supplemental oxygen, the following of ABG’s with A-line monitoring, and continuous pulse oximetry monitoring for support & to evaluate for & prevent further decompensation secondary to persistent cardiopulmonary dysfunction.    Invasive hemodynamic monitoring with an A-line was required for the following of continuous MAP/BP monitoring to ensure adequate cardiovascular support and to evaluate for & help prevent decompensation while receiving intermittent volume expansion, an IV Levophed drip, and an IV Vasopressin drip secondary to hemodynamically significant hypovolemia-shock.      Metabolic stability, uncontrolled Type I Diabetes mellitus-hyperglycemia, & infection prophylaxis required an IV Insulin drip, AM Lantus, premeal regular Insulin & the following of serial glucose levels to help achieve & maintain euglycemia.      Patient required more than the usual postoperative critical care management and I provided 80 minutes of non-continuous care to the patient.  Discussed at length with the CTICU staff and helped coordinate care.

## 2017-09-30 NOTE — PROGRESS NOTE ADULT - SUBJECTIVE AND OBJECTIVE BOX
Operation C4L  POD 1  Narrative Patient resting comfortably in bed    Vital Signs Last 24 Hrs  T(C): 38 (30 Sep 2017 01:00), Max: 38 (30 Sep 2017 01:00)  T(F): 100.4 (30 Sep 2017 01:00), Max: 100.4 (30 Sep 2017 01:00)  HR: 85 (30 Sep 2017 01:00) (67 - 111)  BP: 152/77 (29 Sep 2017 11:47) (138/79 - 152/77)  BP(mean): --  RR: 19 (30 Sep 2017 01:00) (0 - 22)  SpO2: 100% (30 Sep 2017 01:00) (96% - 100%)  I&O's Detail    28 Sep 2017 07:01  -  29 Sep 2017 07:00  --------------------------------------------------------  IN:    heparin  Infusion.: 80 mL    Oral Fluid: 520 mL  Total IN: 600 mL    OUT:    Voided: 600 mL  Total OUT: 600 mL    Total NET: 0 mL      29 Sep 2017 07:01  -  30 Sep 2017 01:28  --------------------------------------------------------  IN:    Albumin 5%  - 250 mL: 1000 mL    insulin Infusion: 20 mL    IV PiggyBack: 800 mL    Lactated Ringers IV Bolus: 500 mL    niCARdipine Infusion: 25 mL    norepinephrine Infusion: 5 mL    sodium chloride 0.45%.: 70 mL    Sodium Chloride 0.9% IV Bolus: 500 mL    vasopressin Infusion: 12 mL  Total IN: 2932 mL    OUT:    Chest Tube: 145 mL    Chest Tube: 25 mL    Indwelling Catheter - Urethral: 740 mL  Total OUT: 910 mL    Total NET: 2022 mL    LABS:    MEDICATIONS  (STANDING):  sodium chloride 0.45%. 1000 milliLiter(s) (10 mL/Hr) IV Continuous <Continuous>  pantoprazole  Injectable 40 milliGRAM(s) IV Push daily  docusate sodium 100 milliGRAM(s) Oral three times a day  insulin Infusion 3 Unit(s)/Hr (3 mL/Hr) IV Continuous <Continuous>  metoclopramide Injectable 10 milliGRAM(s) IV Push every 8 hours  cefuroxime  IVPB 1500 milliGRAM(s) IV Intermittent every 8 hours  aspirin enteric coated 325 milliGRAM(s) Oral daily  norepinephrine Infusion 0.02 MICROgram(s)/kG/Min (3.465 mL/Hr) IV Continuous <Continuous>  vasopressin Infusion 0.033 Unit(s)/Min (2 mL/Hr) IV Continuous <Continuous>  DOBUTamine Infusion 1.25 MICROgram(s)/kG/Min (3.465 mL/Hr) IV Continuous <Continuous>  aspirin 325 milliGRAM(s) Oral daily  magnesium sulfate  IVPB 2 Gram(s) IV Intermittent once  acetaminophen  IVPB. 1000 milliGRAM(s) IV Intermittent once    MEDICATIONS  (PRN):    General: Alert and orientedx3, in no acute distress  Chest: sternal dressing C/D/I  Heart: S1, S2, regular rate and rhythm  Lungs: clear to auscultation B/L - decreased at bases, without wheezes, rhonci, rales  Abdomen: Soft, non distended, non tender, positive bowel sounds  Extremeties: without edema B/L LE, positive DP pulses B/L     Does the patient have a history of CHF: yes  -If yes, systolic or diastolic: systolic  -pre-operative EF: 50-55%, inferior and inferio-lateral wall hypokinesis     Extubation within 24 hours: yes    Does the patient have a history of kidney disease: no  -give CKD stage if applicable:  -what is patient's baseline Creatinine: 0.96    Beta Blockers contraindicated for the first 24 hours due to vasopressor/inotrpic medication: yes  -If on pressors, indication: hemodynamic support    Did the patient receive transfusion of blood and/or products: no  -If yes, indication:    DVT PPX: SCD B/L     Zahida-operative antibiotics discontinued within 48 hours of CTU admission: yes  -name/date/time of discontinue  -nina/9-30-17/22:00    Patient care discussed on morning interdisciplinary rounds with CTS team. Operation C4L  POD 1  Narrative Patient resting comfortably in bed    Vital Signs Last 24 Hrs  T(C): 38 (30 Sep 2017 01:00), Max: 38 (30 Sep 2017 01:00)  T(F): 100.4 (30 Sep 2017 01:00), Max: 100.4 (30 Sep 2017 01:00)  HR: 85 (30 Sep 2017 01:00) (67 - 111)  BP: 152/77 (29 Sep 2017 11:47) (138/79 - 152/77)  BP(mean): --  RR: 19 (30 Sep 2017 01:00) (0 - 22)  SpO2: 100% (30 Sep 2017 01:00) (96% - 100%)  I&O's Detail    28 Sep 2017 07:01  -  29 Sep 2017 07:00  --------------------------------------------------------  IN:    heparin  Infusion.: 80 mL    Oral Fluid: 520 mL  Total IN: 600 mL    OUT:    Voided: 600 mL  Total OUT: 600 mL    Total NET: 0 mL      29 Sep 2017 07:01  -  30 Sep 2017 01:28  --------------------------------------------------------  IN:    Albumin 5%  - 250 mL: 1000 mL    insulin Infusion: 20 mL    IV PiggyBack: 800 mL    Lactated Ringers IV Bolus: 500 mL    niCARdipine Infusion: 25 mL    norepinephrine Infusion: 5 mL    sodium chloride 0.45%.: 70 mL    Sodium Chloride 0.9% IV Bolus: 500 mL    vasopressin Infusion: 12 mL  Total IN: 2932 mL    OUT:    Chest Tube: 145 mL    Chest Tube: 25 mL    Indwelling Catheter - Urethral: 740 mL  Total OUT: 910 mL    Total NET: 2022 mL    LABS:                        10.1   7.9   )-----------( 146      ( 30 Sep 2017 01:35 )             28.4     09-30    141  |  107  |  11  ----------------------------<  100<H>  4.4   |  23  |  0.80    Ca    7.5<L>      30 Sep 2017 01:35  Phos  2.6     09-30    TPro  5.0<L>  /  Alb  3.6  /  TBili  0.9  /  DBili  x   /  AST  27  /  ALT  13  /  AlkPhos  40  09-30    PT/INR - ( 30 Sep 2017 01:35 )   PT: 13.7 sec;   INR: 1.26 ratio         PTT - ( 30 Sep 2017 01:35 )  PTT:29.6 sec  ABG - ( 30 Sep 2017 05:19 )  pH: 7.37  /  pCO2: 36    /  pO2: 97    / HCO3: 20    / Base Excess: -4.1  /  SaO2: 98        MEDICATIONS  (STANDING):  sodium chloride 0.45%. 1000 milliLiter(s) (10 mL/Hr) IV Continuous <Continuous>  pantoprazole  Injectable 40 milliGRAM(s) IV Push daily  docusate sodium 100 milliGRAM(s) Oral three times a day  insulin Infusion 3 Unit(s)/Hr (3 mL/Hr) IV Continuous <Continuous>  metoclopramide Injectable 10 milliGRAM(s) IV Push every 8 hours  cefuroxime  IVPB 1500 milliGRAM(s) IV Intermittent every 8 hours  aspirin enteric coated 325 milliGRAM(s) Oral daily  norepinephrine Infusion 0.02 MICROgram(s)/kG/Min (3.465 mL/Hr) IV Continuous <Continuous>  vasopressin Infusion 0.033 Unit(s)/Min (2 mL/Hr) IV Continuous <Continuous>  DOBUTamine Infusion 1.25 MICROgram(s)/kG/Min (3.465 mL/Hr) IV Continuous <Continuous>  aspirin 325 milliGRAM(s) Oral daily  magnesium sulfate  IVPB 2 Gram(s) IV Intermittent once  acetaminophen  IVPB. 1000 milliGRAM(s) IV Intermittent once    MEDICATIONS  (PRN):    General: Alert and orientedx3, in no acute distress  Chest: sternal dressing C/D/I  Heart: S1, S2, regular rate and rhythm  Lungs: clear to auscultation B/L - decreased at bases, without wheezes, rhonci, rales  Abdomen: Soft, non distended, non tender, positive bowel sounds  Extremeties: without edema B/L LE, positive DP pulses B/L     Does the patient have a history of CHF: yes  -If yes, systolic or diastolic: systolic  -pre-operative EF: 50-55%, inferior and inferio-lateral wall hypokinesis     Extubation within 24 hours: yes    Does the patient have a history of kidney disease: no  -give CKD stage if applicable:  -what is patient's baseline Creatinine: 0.96    Beta Blockers contraindicated for the first 24 hours due to vasopressor/inotrpic medication: yes  -If on pressors, indication: hemodynamic support    Did the patient receive transfusion of blood and/or products: no  -If yes, indication:    DVT PPX: SCD B/L     Zahida-operative antibiotics discontinued within 48 hours of CTU admission: yes  -name/date/time of discontinue  -zinacef/9-30-17/22:00    Patient care discussed on morning interdisciplinary rounds with CTS team.

## 2017-09-30 NOTE — PHYSICAL THERAPY INITIAL EVALUATION ADULT - PERTINENT HX OF CURRENT PROBLEM, REHAB EVAL
61 yo male PMH DM 1 managed by Dr. Roberson A1C 7.7, HTN, RBBB presented to cardiology, Dr. Condon, with complaints of nonradiating substernal chest pain that occurs during exercise and relieved with rest after approx 2 hours; Pt now s/p C4L

## 2017-09-30 NOTE — PROGRESS NOTE ADULT - ASSESSMENT
62 year old male s/p C4L  1. wean  as tolerated  2. beta blockers later - as tolerated for a-fib ppx  3. Aspirin, statin for graft patency ppx  4. GI ppx  5. VTE ppx  6. OOB to chair, ambulate as tolerated  7. Blood sugar control  8. Pain management

## 2017-09-30 NOTE — PHYSICAL THERAPY INITIAL EVALUATION ADULT - ADDITIONAL COMMENTS
Pt lives alone in a town house w/ 1 step to enter and 1 flt to bedroom. PTA indep w/o an assist device for all ADLs. Pt states son may stay with him initially upon d/c.

## 2017-10-01 LAB
ALBUMIN SERPL ELPH-MCNC: 3.5 G/DL — SIGNIFICANT CHANGE UP (ref 3.3–5)
ALP SERPL-CCNC: 66 U/L — SIGNIFICANT CHANGE UP (ref 40–120)
ALT FLD-CCNC: 46 U/L RC — HIGH (ref 10–45)
ANION GAP SERPL CALC-SCNC: 13 MMOL/L — SIGNIFICANT CHANGE UP (ref 5–17)
AST SERPL-CCNC: 81 U/L — HIGH (ref 10–40)
BILIRUB SERPL-MCNC: 0.8 MG/DL — SIGNIFICANT CHANGE UP (ref 0.2–1.2)
BUN SERPL-MCNC: 12 MG/DL — SIGNIFICANT CHANGE UP (ref 7–23)
CALCIUM SERPL-MCNC: 8.3 MG/DL — LOW (ref 8.4–10.5)
CHLORIDE SERPL-SCNC: 106 MMOL/L — SIGNIFICANT CHANGE UP (ref 96–108)
CO2 SERPL-SCNC: 20 MMOL/L — LOW (ref 22–31)
CREAT SERPL-MCNC: 0.68 MG/DL — SIGNIFICANT CHANGE UP (ref 0.5–1.3)
GAS PNL BLDA: SIGNIFICANT CHANGE UP
GLUCOSE SERPL-MCNC: 86 MG/DL — SIGNIFICANT CHANGE UP (ref 70–99)
HCT VFR BLD CALC: 27.9 % — LOW (ref 39–50)
HGB BLD-MCNC: 9.7 G/DL — LOW (ref 13–17)
MCHC RBC-ENTMCNC: 32.3 PG — SIGNIFICANT CHANGE UP (ref 27–34)
MCHC RBC-ENTMCNC: 34.8 GM/DL — SIGNIFICANT CHANGE UP (ref 32–36)
MCV RBC AUTO: 92.7 FL — SIGNIFICANT CHANGE UP (ref 80–100)
PHOSPHATE SERPL-MCNC: 1.9 MG/DL — LOW (ref 2.5–4.5)
PLATELET # BLD AUTO: 134 K/UL — LOW (ref 150–400)
POTASSIUM SERPL-MCNC: 4.4 MMOL/L — SIGNIFICANT CHANGE UP (ref 3.5–5.3)
POTASSIUM SERPL-SCNC: 4.4 MMOL/L — SIGNIFICANT CHANGE UP (ref 3.5–5.3)
PROT SERPL-MCNC: 5.5 G/DL — LOW (ref 6–8.3)
RBC # BLD: 3.01 M/UL — LOW (ref 4.2–5.8)
RBC # FLD: 11.2 % — SIGNIFICANT CHANGE UP (ref 10.3–14.5)
SODIUM SERPL-SCNC: 139 MMOL/L — SIGNIFICANT CHANGE UP (ref 135–145)
WBC # BLD: 8.8 K/UL — SIGNIFICANT CHANGE UP (ref 3.8–10.5)
WBC # FLD AUTO: 8.8 K/UL — SIGNIFICANT CHANGE UP (ref 3.8–10.5)

## 2017-10-01 PROCEDURE — 71010: CPT | Mod: 26

## 2017-10-01 RX ORDER — INSULIN LISPRO 100/ML
8 VIAL (ML) SUBCUTANEOUS
Qty: 0 | Refills: 0 | Status: DISCONTINUED | OUTPATIENT
Start: 2017-10-01 | End: 2017-10-04

## 2017-10-01 RX ORDER — METOPROLOL TARTRATE 50 MG
25 TABLET ORAL
Qty: 0 | Refills: 0 | Status: DISCONTINUED | OUTPATIENT
Start: 2017-10-01 | End: 2017-10-02

## 2017-10-01 RX ADMIN — OXYCODONE AND ACETAMINOPHEN 1 TABLET(S): 5; 325 TABLET ORAL at 06:40

## 2017-10-01 RX ADMIN — INSULIN HUMAN 8 UNIT(S): 100 INJECTION, SOLUTION SUBCUTANEOUS at 08:32

## 2017-10-01 RX ADMIN — ATORVASTATIN CALCIUM 40 MILLIGRAM(S): 80 TABLET, FILM COATED ORAL at 21:43

## 2017-10-01 RX ADMIN — Medication 325 MILLIGRAM(S): at 12:24

## 2017-10-01 RX ADMIN — Medication 8 UNIT(S): at 16:57

## 2017-10-01 RX ADMIN — Medication 100 MILLIGRAM(S): at 12:26

## 2017-10-01 RX ADMIN — Medication 1: at 16:57

## 2017-10-01 RX ADMIN — INSULIN HUMAN 8 UNIT(S): 100 INJECTION, SOLUTION SUBCUTANEOUS at 11:57

## 2017-10-01 RX ADMIN — OXYCODONE AND ACETAMINOPHEN 1 TABLET(S): 5; 325 TABLET ORAL at 21:00

## 2017-10-01 RX ADMIN — Medication 25 MILLIGRAM(S): at 16:06

## 2017-10-01 RX ADMIN — Medication 25 MILLIGRAM(S): at 06:35

## 2017-10-01 RX ADMIN — ENOXAPARIN SODIUM 40 MILLIGRAM(S): 100 INJECTION SUBCUTANEOUS at 12:24

## 2017-10-01 RX ADMIN — Medication 2: at 11:58

## 2017-10-01 RX ADMIN — Medication: at 08:31

## 2017-10-01 RX ADMIN — OXYCODONE AND ACETAMINOPHEN 1 TABLET(S): 5; 325 TABLET ORAL at 20:21

## 2017-10-01 RX ADMIN — INSULIN GLARGINE 60 UNIT(S): 100 INJECTION, SOLUTION SUBCUTANEOUS at 07:33

## 2017-10-01 RX ADMIN — OXYCODONE AND ACETAMINOPHEN 1 TABLET(S): 5; 325 TABLET ORAL at 07:10

## 2017-10-01 RX ADMIN — Medication 100 MILLIGRAM(S): at 06:35

## 2017-10-01 RX ADMIN — PANTOPRAZOLE SODIUM 40 MILLIGRAM(S): 20 TABLET, DELAYED RELEASE ORAL at 07:34

## 2017-10-01 RX ADMIN — OXYCODONE AND ACETAMINOPHEN 2 TABLET(S): 5; 325 TABLET ORAL at 09:05

## 2017-10-01 RX ADMIN — Medication 63.75 MILLIMOLE(S): at 04:50

## 2017-10-01 RX ADMIN — Medication 100 MILLIGRAM(S): at 21:43

## 2017-10-01 RX ADMIN — OXYCODONE AND ACETAMINOPHEN 2 TABLET(S): 5; 325 TABLET ORAL at 08:35

## 2017-10-01 NOTE — PROGRESS NOTE ADULT - PROBLEM/PLAN-1
Pt back from nuclear med testing via wheelchair assisted by tech. Pt ambulated to bed and is in no apparent distress. Will continue to monitor pt closely.   DISPLAY PLAN FREE TEXT

## 2017-10-01 NOTE — PROGRESS NOTE ADULT - ASSESSMENT
62 yrs old male S/p C4L  9/29    post op pressors   since d/c , Home lantus for hyperglycemia  10/1   trf too floor, VSS

## 2017-10-01 NOTE — PROGRESS NOTE ADULT - SUBJECTIVE AND OBJECTIVE BOX
VITAL SIGNS    Telemetry:       sr-st   100  Vital Signs Last 24 Hrs  T(C): 37.4 (10-01-17 @ 12:00), Max: 38.1 (09-30-17 @ 23:00)  T(F): 99.3 (10-01-17 @ 12:00), Max: 100.6 (09-30-17 @ 23:00)  HR: 100 (10-01-17 @ 08:00) (79 - 100)  BP: 136/67 (10-01-17 @ 12:00) (107/62 - 144/67)  RR: 22 (10-01-17 @ 12:00) (16 - 39)  SpO2: 100% (10-01-17 @ 12:00) (91% - 100%)             Daily       Bilirubin Total, Serum: 0.8 mg/dL (10-01 @ 02:17)    CAPILLARY BLOOD GLUCOSE  237 (01 Oct 2017 11:18)  120 (30 Sep 2017 21:00)  187 (30 Sep 2017 17:00)         Drains:  out    Pacing Wires                                    Isolated  [x  ]    Coumadin            [x  ]      NO                         PHYSICAL EXAM    Neurology: alert and oriented x 3, moves all extremities with no defecits  CV :  RRR  Sternal Wound :  CDI , Stable+pw  Lungs:   diminshed b/l throughout  Abdomen: soft, nontender, nondistended, positive bowel sounds, no BM  Extremities:     trace led edema   no calf tenderness

## 2017-10-01 NOTE — PROGRESS NOTE ADULT - SUBJECTIVE AND OBJECTIVE BOX
Operation; C4L   POD # 2    Patient is doing well, resting comfortably; complains of mild incisional pain that is relieved by pain medication.    Vital Signs Last 24 Hrs  T(C): 38.1 (30 Sep 2017 23:00), Max: 38.2 (30 Sep 2017 03:00)  T(F): 100.6 (30 Sep 2017 23:00), Max: 100.8 (30 Sep 2017 03:00)  HR: 85 (01 Oct 2017 01:00) (71 - 103)  BP: 107/62 (01 Oct 2017 01:00) (107/62 - 116/67)  BP(mean): 80 (01 Oct 2017 01:00) (76 - 86)  RR: 17 (01 Oct 2017 01:00) (16 - 39)  SpO2: 100% (01 Oct 2017 01:00) (91% - 100%)  I&O's Detail    29 Sep 2017 07:01  -  30 Sep 2017 07:00  --------------------------------------------------------  IN:    Albumin 5%  - 250 mL: 1000 mL    DOBUTamine Infusion: 20.7 mL    DOBUTamine Infusion: 7 mL    insulin Infusion: 35 mL    IV PiggyBack: 900 mL    Lactated Ringers IV Bolus: 500 mL    niCARdipine Infusion: 25 mL    norepinephrine Infusion: 10 mL    sodium chloride 0.45%: 120 mL    Sodium Chloride 0.9% IV Bolus: 500 mL    vasopressin Infusion: 42 mL  Total IN: 3159.7 mL    OUT:    Chest Tube: 190 mL    Chest Tube: 35 mL    Indwelling Catheter - Urethral: 1055 mL  Total OUT: 1280 mL    Total NET: 1879.7 mL      30 Sep 2017 07:01  -  01 Oct 2017 02:36  --------------------------------------------------------  IN:    insulin Infusion: 22 mL    IV PiggyBack: 150 mL    norepinephrine Infusion: 17 mL    norepinephrine Infusion: 12 mL    Oral Fluid: 240 mL    vasopressin Infusion: 37 mL  Total IN: 478 mL    OUT:    Chest Tube: 210 mL    Indwelling Catheter - Urethral: 1520 mL  Total OUT: 1730 mL    Total NET: -1252 mL          CHEST TUBE:  Dc'd The chest tube  EPICARDIAL WIRES: Ventricular wires   URIBE: Yes    MEDICATIONS  (STANDING):  docusate sodium 100 milliGRAM(s) Oral three times a day  insulin Infusion 3 Unit(s)/Hr (3 mL/Hr) IV Continuous <Continuous>  aspirin 325 milliGRAM(s) Oral daily  enoxaparin Injectable 40 milliGRAM(s) SubCutaneous daily  pantoprazole    Tablet 40 milliGRAM(s) Oral before breakfast  atorvastatin 40 milliGRAM(s) Oral at bedtime  insulin glargine Injectable (LANTUS) 60 Unit(s) SubCutaneous every morning  insulin regular  human recombinant 8 Unit(s) SubCutaneous three times a day with meals  dextrose 5%. 1000 milliLiter(s) (50 mL/Hr) IV Continuous <Continuous>  dextrose 50% Injectable 12.5 Gram(s) IV Push once  dextrose 50% Injectable 25 Gram(s) IV Push once  dextrose 50% Injectable 25 Gram(s) IV Push once  insulin lispro (HumaLOG) corrective regimen sliding scale   SubCutaneous three times a day before meals  insulin lispro (HumaLOG) corrective regimen sliding scale   SubCutaneous at bedtime  metoprolol 25 milliGRAM(s) Oral two times a day    MEDICATIONS  (PRN):  oxyCODONE    5 mG/acetaminophen 325 mG 1 Tablet(s) Oral every 6 hours PRN Moderate Pain (4 - 6)  oxyCODONE    5 mG/acetaminophen 325 mG 2 Tablet(s) Oral every 6 hours PRN Severe Pain (7 - 10)  dextrose Gel 1 Dose(s) Oral once PRN Blood Glucose LESS THAN 70 milliGRAM(s)/deciliter  glucagon  Injectable 1 milliGRAM(s) IntraMuscular once PRN Glucose LESS THAN 70 milligrams/deciliter      General: A+Ox3, in NAD  Chest: sternal dressing C/D/I  Heart: S1, S2, RRR  Lungs: CTA B/L,diminished at the bases  Abdomen: Soft, ND, NT, positive BS  Extremeties: without edema B/L LE, positive DP pulses B/L     Does the patient have a history of CHF?  -If yes, systolic or diastolic  -pre-operative EF NL    Extubation within 24 hours? yes    CXR reviewed with attending.     Does the patient have a history of kidney disease?  no  -what is patient's baseline Creatinine    Beta Blockers contraindicated for the first 24 hours due to vasopressor/inotropic medication      Did the patient receive transfusion of blood and/or products? none  -If yes, indication    DVT PPX with vendodynes as well as chemical prophylaxis.    Zahida-operative antibiotics to be discontinued within 48 hours of CTU admission    Patient care discussed on morning interdisciplinary rounds with CTS team.

## 2017-10-01 NOTE — PROGRESS NOTE ADULT - ASSESSMENT
62 yrs old male S/p C4L       1. beta blockers -  for a-fib ppx  2. Aspirin, statin for graft patency ppx  3. GI ppx  4. VTE ppx  5. OOB to chair, ambulate as tolerated  6.glycemic control with lantus  7 pain management

## 2017-10-02 DIAGNOSIS — Z95.1 PRESENCE OF AORTOCORONARY BYPASS GRAFT: ICD-10-CM

## 2017-10-02 LAB
ANION GAP SERPL CALC-SCNC: 13 MMOL/L — SIGNIFICANT CHANGE UP (ref 5–17)
BUN SERPL-MCNC: 10 MG/DL — SIGNIFICANT CHANGE UP (ref 7–23)
CALCIUM SERPL-MCNC: 8.5 MG/DL — SIGNIFICANT CHANGE UP (ref 8.4–10.5)
CHLORIDE SERPL-SCNC: 105 MMOL/L — SIGNIFICANT CHANGE UP (ref 96–108)
CO2 SERPL-SCNC: 25 MMOL/L — SIGNIFICANT CHANGE UP (ref 22–31)
CREAT SERPL-MCNC: 0.8 MG/DL — SIGNIFICANT CHANGE UP (ref 0.5–1.3)
GLUCOSE SERPL-MCNC: 99 MG/DL — SIGNIFICANT CHANGE UP (ref 70–99)
HCT VFR BLD CALC: 29 % — LOW (ref 39–50)
HGB BLD-MCNC: 10 G/DL — LOW (ref 13–17)
MCHC RBC-ENTMCNC: 31.7 PG — SIGNIFICANT CHANGE UP (ref 27–34)
MCHC RBC-ENTMCNC: 34.6 GM/DL — SIGNIFICANT CHANGE UP (ref 32–36)
MCV RBC AUTO: 91.8 FL — SIGNIFICANT CHANGE UP (ref 80–100)
PLATELET # BLD AUTO: 154 K/UL — SIGNIFICANT CHANGE UP (ref 150–400)
POTASSIUM SERPL-MCNC: 3.6 MMOL/L — SIGNIFICANT CHANGE UP (ref 3.5–5.3)
POTASSIUM SERPL-SCNC: 3.6 MMOL/L — SIGNIFICANT CHANGE UP (ref 3.5–5.3)
RBC # BLD: 3.16 M/UL — LOW (ref 4.2–5.8)
RBC # FLD: 11.2 % — SIGNIFICANT CHANGE UP (ref 10.3–14.5)
SODIUM SERPL-SCNC: 143 MMOL/L — SIGNIFICANT CHANGE UP (ref 135–145)
WBC # BLD: 7.9 K/UL — SIGNIFICANT CHANGE UP (ref 3.8–10.5)
WBC # FLD AUTO: 7.9 K/UL — SIGNIFICANT CHANGE UP (ref 3.8–10.5)

## 2017-10-02 PROCEDURE — 71010: CPT | Mod: 26

## 2017-10-02 PROCEDURE — 99232 SBSQ HOSP IP/OBS MODERATE 35: CPT

## 2017-10-02 RX ORDER — ALPRAZOLAM 0.25 MG
0.25 TABLET ORAL AT BEDTIME
Qty: 0 | Refills: 0 | Status: DISCONTINUED | OUTPATIENT
Start: 2017-10-02 | End: 2017-10-04

## 2017-10-02 RX ORDER — POT CHLORIDE/POT BICARB/CIT AC 25 MEQ
25 TABLET, EFFERVESCENT ORAL
Qty: 0 | Refills: 0 | Status: COMPLETED | OUTPATIENT
Start: 2017-10-02 | End: 2017-10-02

## 2017-10-02 RX ORDER — METOPROLOL TARTRATE 50 MG
25 TABLET ORAL EVERY 8 HOURS
Qty: 0 | Refills: 0 | Status: DISCONTINUED | OUTPATIENT
Start: 2017-10-02 | End: 2017-10-02

## 2017-10-02 RX ORDER — INSULIN LISPRO 100/ML
VIAL (ML) SUBCUTANEOUS
Qty: 0 | Refills: 0 | Status: DISCONTINUED | OUTPATIENT
Start: 2017-10-02 | End: 2017-10-04

## 2017-10-02 RX ORDER — POLYETHYLENE GLYCOL 3350 17 G/17G
17 POWDER, FOR SOLUTION ORAL DAILY
Qty: 0 | Refills: 0 | Status: DISCONTINUED | OUTPATIENT
Start: 2017-10-02 | End: 2017-10-04

## 2017-10-02 RX ORDER — METOPROLOL TARTRATE 50 MG
50 TABLET ORAL
Qty: 0 | Refills: 0 | Status: DISCONTINUED | OUTPATIENT
Start: 2017-10-02 | End: 2017-10-04

## 2017-10-02 RX ADMIN — Medication 8 UNIT(S): at 11:47

## 2017-10-02 RX ADMIN — PANTOPRAZOLE SODIUM 40 MILLIGRAM(S): 20 TABLET, DELAYED RELEASE ORAL at 06:50

## 2017-10-02 RX ADMIN — Medication 25 MILLIGRAM(S): at 05:10

## 2017-10-02 RX ADMIN — Medication 8 UNIT(S): at 17:03

## 2017-10-02 RX ADMIN — Medication 25 MILLIGRAM(S): at 13:25

## 2017-10-02 RX ADMIN — OXYCODONE AND ACETAMINOPHEN 2 TABLET(S): 5; 325 TABLET ORAL at 05:30

## 2017-10-02 RX ADMIN — Medication 2: at 17:03

## 2017-10-02 RX ADMIN — Medication 325 MILLIGRAM(S): at 13:25

## 2017-10-02 RX ADMIN — OXYCODONE AND ACETAMINOPHEN 2 TABLET(S): 5; 325 TABLET ORAL at 22:05

## 2017-10-02 RX ADMIN — Medication 50 MILLIGRAM(S): at 18:20

## 2017-10-02 RX ADMIN — ENOXAPARIN SODIUM 40 MILLIGRAM(S): 100 INJECTION SUBCUTANEOUS at 13:26

## 2017-10-02 RX ADMIN — OXYCODONE AND ACETAMINOPHEN 2 TABLET(S): 5; 325 TABLET ORAL at 04:34

## 2017-10-02 RX ADMIN — Medication 25 MILLIEQUIVALENT(S): at 14:51

## 2017-10-02 RX ADMIN — Medication 8 UNIT(S): at 07:50

## 2017-10-02 RX ADMIN — Medication 100 MILLIGRAM(S): at 05:10

## 2017-10-02 RX ADMIN — OXYCODONE AND ACETAMINOPHEN 1 TABLET(S): 5; 325 TABLET ORAL at 05:19

## 2017-10-02 RX ADMIN — OXYCODONE AND ACETAMINOPHEN 2 TABLET(S): 5; 325 TABLET ORAL at 22:36

## 2017-10-02 RX ADMIN — OXYCODONE AND ACETAMINOPHEN 1 TABLET(S): 5; 325 TABLET ORAL at 04:29

## 2017-10-02 RX ADMIN — POLYETHYLENE GLYCOL 3350 17 GRAM(S): 17 POWDER, FOR SOLUTION ORAL at 13:26

## 2017-10-02 RX ADMIN — INSULIN GLARGINE 60 UNIT(S): 100 INJECTION, SOLUTION SUBCUTANEOUS at 08:28

## 2017-10-02 RX ADMIN — Medication 25 MILLIEQUIVALENT(S): at 15:09

## 2017-10-02 RX ADMIN — Medication 100 MILLIGRAM(S): at 13:26

## 2017-10-02 RX ADMIN — Medication 100 MILLIGRAM(S): at 22:04

## 2017-10-02 RX ADMIN — ATORVASTATIN CALCIUM 40 MILLIGRAM(S): 80 TABLET, FILM COATED ORAL at 22:04

## 2017-10-02 NOTE — PROGRESS NOTE ADULT - SUBJECTIVE AND OBJECTIVE BOX
Diabetes Follow up note:  Interval Hx:  62 year old male w/long standing T1DM found to have TVD s/p CABG x 4 POD #3. Pt was transitioned off insulin gtt POD 1 and restarted on basal/bolus. Tolerating POs w/good appetite. BG values mostly 100's on current regimen.     Review of Systems:  General: Denies pain.   GI: Tolerating POs without any N/V/D/ABD PAIN.  CV: No CP/SOB  ENDO: No S&Sx of hypoglycemia  MEDS:  insulin Infusion 3 Unit(s)/Hr IV Continuous <Continuous>  atorvastatin 40 milliGRAM(s) Oral at bedtime  insulin glargine Injectable (LANTUS) 60 Unit(s) SubCutaneous every morning  insulin lispro (HumaLOG) corrective regimen sliding scale   SubCutaneous three times a day before meals  insulin lispro (HumaLOG) corrective regimen sliding scale   SubCutaneous at bedtime  insulin lispro Injectable (HumaLOG) 8 Unit(s) SubCutaneous three times a day before meals      Allergies    No Known Allergies        PE:  General: Male sitting in chair. NAD.   Vital Signs Last 24 Hrs  T(C): 36.8 (02 Oct 2017 11:00), Max: 37.2 (01 Oct 2017 20:00)  T(F): 98.3 (02 Oct 2017 11:00), Max: 99 (01 Oct 2017 20:00)  HR: 90 (02 Oct 2017 11:00) (80 - 112)  BP: 141/67 (02 Oct 2017 11:00) (121/59 - 155/74)  BP(mean): 83 (02 Oct 2017 00:00) (83 - 107)  RR: 18 (02 Oct 2017 11:00) (18 - 18)  SpO2: 93% (02 Oct 2017 11:00) (93% - 99%)  Abd: Soft, NT,ND,   CV: S1, S2. NSR on monitor.   Extremities: Warm. trace edema in LE's  Neuro: A&O X3    LABS:  CAPILLARY BLOOD GLUCOSE  142 (10-02 @ 11:36)  138 (10-02 @ 07:00)  153 (10-01 @ 21:35)  185 (10-01 @ 16:13)  237 (10-01 @ 11:18)  120 (09-30 @ 21:00)  187 (09-30 @ 17:00)  135 (09-30 @ 12:00)  165 (09-30 @ 11:00)  169 (09-30 @ 10:00)  119 (09-30 @ 09:00)  121 (09-30 @ 08:00)  140 (09-30 @ 07:00)  149 (09-30 @ 06:00)  101 (09-30 @ 02:00)  111 (09-30 @ 01:00)  128 (09-30 @ 00:00)  139 (09-29 @ 23:00)  143 (09-29 @ 22:00)  144 (09-29 @ 21:00)  127 (09-29 @ 20:00)  127 (09-29 @ 19:00)  92 (09-29 @ 18:00)                            10.0   7.9   )-----------( 154      ( 02 Oct 2017 03:19 )             29.0       10-02    143  |  105  |  10  ----------------------------<  99  3.6   |  25  |  0.80    Ca    8.5      02 Oct 2017 03:19  Phos  1.9     10-01    TPro  5.5<L>  /  Alb  3.5  /  TBili  0.8  /  DBili  x   /  AST  81<H>  /  ALT  46<H>  /  AlkPhos  66  10-01      Thyroid Function Tests:  09-28 @ 13:46 TSH 4.83 FreeT4 -- T3 126 Anti TPO -- Anti Thyroglobulin Ab -- TSI --      Hemoglobin A1C, Whole Blood: 7.4 % <H> [4.0 - 5.6] (09-29-17 @ 07:36)  Hemoglobin A1C, Whole Blood: 7.5 % <H> [4.0 - 5.6] (09-28-17 @ 13:56)            Contact number: daiana [phone] or [phone]

## 2017-10-02 NOTE — PROGRESS NOTE ADULT - ASSESSMENT
62 year old male w/T1DM (A1C-7.4%) s/p CABG x 4. BG values at goal on current insulin regimen. Pt takes same insulin doses at home and follows w/PCP. Tolerating POs. Creatinine/WBC stable. BG goal (100-150mg/dl).

## 2017-10-02 NOTE — PROGRESS NOTE ADULT - PROBLEM SELECTOR PLAN 1
-test BG AC/HS  -c/w Lantus 60 units QAM   -c/w Humalog 8 units AC meals  -Adjust Humalog correction scale AC (moderate scale) and Mod HS scale.   -can continue home basal/bolus regimen (lantus/novolog) at discharge  -will follow up with PCP. Gave info for Dr Derek Krueger-endocrine ([place])  pager: [phone]

## 2017-10-02 NOTE — PROGRESS NOTE ADULT - SUBJECTIVE AND OBJECTIVE BOX
Im ok    VITAL SIGNS    Telemetry:   NSR     Vital Signs Last 24 Hrs  T(C): 36.8 (10-02-17 @ 11:00), Max: 37.4 (10-01-17 @ 12:00)  T(F): 98.3 (10-02-17 @ 11:00), Max: 99.3 (10-01-17 @ 12:00)  HR: 90 (10-02-17 @ 11:00) (80 - 112)  BP: 141/67 (10-02-17 @ 11:00) (121/59 - 155/74)  RR: 18 (10-02-17 @ 11:00) (18 - 22)  SpO2: 93% (10-02-17 @ 11:00) (93% - 100%)                   10-01 @ 07:01  -  10-02 @ 07:00  --------------------------------------------------------  IN: 1110 mL / OUT: 851 mL / NET: 259 mL    10-02 @ 07:01  -  10-02 @ 11:19  --------------------------------------------------------  IN: 240 mL / OUT: 0 mL / NET: 240 mL          Daily     Daily Weight in k (02 Oct 2017 04:50)        CAPILLARY BLOOD GLUCOSE  138 (02 Oct 2017 07:00)  153 (01 Oct 2017 21:35)  185 (01 Oct 2017 16:13)                      Coumadin    [ ] YES          [  ]      NO         Reason:                               PHYSICAL EXAM        Neurology: alert and oriented x 3, nonfocal, no gross deficits  CV : S1 S2 , RRR   Sternal Wound :  CDI , Stable  Pacing Wires        [  ]   Settings:                                  Isolated  [ x ]  Lungs: cta  Abdomen: soft, nontender, nondistended, positive bowel sounds, last bowel movement   :        voiding     Extremities:        - edema, negative calve tenderness,  L  leg incision cdi           docusate sodium 100 milliGRAM(s) Oral three times a day  insulin Infusion 3 Unit(s)/Hr IV Continuous <Continuous>  aspirin 325 milliGRAM(s) Oral daily  oxyCODONE    5 mG/acetaminophen 325 mG 1 Tablet(s) Oral every 6 hours PRN  oxyCODONE    5 mG/acetaminophen 325 mG 2 Tablet(s) Oral every 6 hours PRN  enoxaparin Injectable 40 milliGRAM(s) SubCutaneous daily  pantoprazole    Tablet 40 milliGRAM(s) Oral before breakfast  atorvastatin 40 milliGRAM(s) Oral at bedtime  insulin glargine Injectable (LANTUS) 60 Unit(s) SubCutaneous every morning  dextrose 5%. 1000 milliLiter(s) IV Continuous <Continuous>  dextrose Gel 1 Dose(s) Oral once PRN  dextrose 50% Injectable 12.5 Gram(s) IV Push once  dextrose 50% Injectable 25 Gram(s) IV Push once  dextrose 50% Injectable 25 Gram(s) IV Push once  glucagon  Injectable 1 milliGRAM(s) IntraMuscular once PRN  insulin lispro (HumaLOG) corrective regimen sliding scale   SubCutaneous three times a day before meals  insulin lispro (HumaLOG) corrective regimen sliding scale   SubCutaneous at bedtime  metoprolol 25 milliGRAM(s) Oral two times a day  insulin lispro Injectable (HumaLOG) 8 Unit(s) SubCutaneous three times a day before meals  potassium bicarbonate/potassium chloride Effervescent Tablet 25 milliEquivalent(s) Oral every 1 hour                    Physical Therapy Rec:   Home  [x ]   Home w/ PT  [  ]  Rehab  [  ]  Discussed with Cardiothoracic Team at AM rounds.

## 2017-10-02 NOTE — PROGRESS NOTE ADULT - ASSESSMENT
This is a 61 yo male PMH DM 1 managed by Dr. Roberson A1C 7.7, HTN, RBBB presented to cardiology, Dr. Condon, with complaints of nonradiating substernal chest pain that occurs during exercise and relieved with rest after approx 2 hours that began approx 1 week ago. Pt. is an avid exerciser and rides a bike up to 30 miles.  Pt. denies SOB/OCAMPO, dizziness, diaphoresis, palpitations, nausea, vomiting, syncope, recent weight gain, peripheral edema, or syncope.  Pt. presents today asymptomatic for further evaluation and cardiac cath.      S/p Cabg x 4 w limaL  9/29    post op pressors - d/c , Home lantus for hyperglycemia  10/1   transferred to sdu   NSR on lopressor. Titrate up  Glucose controlled  Tranfer to floor

## 2017-10-03 ENCOUNTER — TRANSCRIPTION ENCOUNTER (OUTPATIENT)
Age: 62
End: 2017-10-03

## 2017-10-03 LAB
ANION GAP SERPL CALC-SCNC: 16 MMOL/L — SIGNIFICANT CHANGE UP (ref 5–17)
BUN SERPL-MCNC: 13 MG/DL — SIGNIFICANT CHANGE UP (ref 7–23)
CALCIUM SERPL-MCNC: 8.6 MG/DL — SIGNIFICANT CHANGE UP (ref 8.4–10.5)
CHLORIDE SERPL-SCNC: 103 MMOL/L — SIGNIFICANT CHANGE UP (ref 96–108)
CO2 SERPL-SCNC: 22 MMOL/L — SIGNIFICANT CHANGE UP (ref 22–31)
CREAT SERPL-MCNC: 0.8 MG/DL — SIGNIFICANT CHANGE UP (ref 0.5–1.3)
GLUCOSE SERPL-MCNC: 111 MG/DL — HIGH (ref 70–99)
HCT VFR BLD CALC: 30.5 % — LOW (ref 39–50)
HGB BLD-MCNC: 10.4 G/DL — LOW (ref 13–17)
MCHC RBC-ENTMCNC: 31.7 PG — SIGNIFICANT CHANGE UP (ref 27–34)
MCHC RBC-ENTMCNC: 34.2 GM/DL — SIGNIFICANT CHANGE UP (ref 32–36)
MCV RBC AUTO: 92.7 FL — SIGNIFICANT CHANGE UP (ref 80–100)
PLATELET # BLD AUTO: 213 K/UL — SIGNIFICANT CHANGE UP (ref 150–400)
POTASSIUM SERPL-MCNC: 3.9 MMOL/L — SIGNIFICANT CHANGE UP (ref 3.5–5.3)
POTASSIUM SERPL-SCNC: 3.9 MMOL/L — SIGNIFICANT CHANGE UP (ref 3.5–5.3)
RBC # BLD: 3.28 M/UL — LOW (ref 4.2–5.8)
RBC # FLD: 11.1 % — SIGNIFICANT CHANGE UP (ref 10.3–14.5)
SODIUM SERPL-SCNC: 141 MMOL/L — SIGNIFICANT CHANGE UP (ref 135–145)
WBC # BLD: 8.4 K/UL — SIGNIFICANT CHANGE UP (ref 3.8–10.5)
WBC # FLD AUTO: 8.4 K/UL — SIGNIFICANT CHANGE UP (ref 3.8–10.5)

## 2017-10-03 RX ORDER — SORBITOL SOLUTION 70 %
30 SOLUTION, ORAL MISCELLANEOUS ONCE
Qty: 0 | Refills: 0 | Status: COMPLETED | OUTPATIENT
Start: 2017-10-03 | End: 2017-10-03

## 2017-10-03 RX ADMIN — Medication 100 MILLIGRAM(S): at 12:19

## 2017-10-03 RX ADMIN — Medication 8 UNIT(S): at 17:04

## 2017-10-03 RX ADMIN — OXYCODONE AND ACETAMINOPHEN 2 TABLET(S): 5; 325 TABLET ORAL at 22:31

## 2017-10-03 RX ADMIN — Medication 8 UNIT(S): at 07:33

## 2017-10-03 RX ADMIN — Medication 8 UNIT(S): at 12:17

## 2017-10-03 RX ADMIN — Medication 100 MILLIGRAM(S): at 21:35

## 2017-10-03 RX ADMIN — Medication 50 MILLIGRAM(S): at 21:35

## 2017-10-03 RX ADMIN — POLYETHYLENE GLYCOL 3350 17 GRAM(S): 17 POWDER, FOR SOLUTION ORAL at 12:17

## 2017-10-03 RX ADMIN — OXYCODONE AND ACETAMINOPHEN 2 TABLET(S): 5; 325 TABLET ORAL at 23:00

## 2017-10-03 RX ADMIN — Medication 30 MILLILITER(S): at 10:18

## 2017-10-03 RX ADMIN — Medication 4: at 17:04

## 2017-10-03 RX ADMIN — PANTOPRAZOLE SODIUM 40 MILLIGRAM(S): 20 TABLET, DELAYED RELEASE ORAL at 05:31

## 2017-10-03 RX ADMIN — Medication 2: at 07:33

## 2017-10-03 RX ADMIN — ATORVASTATIN CALCIUM 40 MILLIGRAM(S): 80 TABLET, FILM COATED ORAL at 21:35

## 2017-10-03 RX ADMIN — Medication 325 MILLIGRAM(S): at 12:17

## 2017-10-03 RX ADMIN — Medication 50 MILLIGRAM(S): at 05:31

## 2017-10-03 RX ADMIN — Medication 100 MILLIGRAM(S): at 05:31

## 2017-10-03 RX ADMIN — ENOXAPARIN SODIUM 40 MILLIGRAM(S): 100 INJECTION SUBCUTANEOUS at 12:17

## 2017-10-03 RX ADMIN — INSULIN GLARGINE 60 UNIT(S): 100 INJECTION, SOLUTION SUBCUTANEOUS at 07:30

## 2017-10-03 NOTE — DISCHARGE NOTE ADULT - HOSPITAL COURSE
63 yo male PMH DM 1 managed by Dr. Roberson A1C 7.7, HTN, RBBB  s/p 09/29/2017  CABGx4 (LIMA-LAD, SVG-diag, SVG-ramus, SVG-RCA)     post op pressors support . Endocrinology consulted for Hgb A1C 7.4   10/1   transferred to sdu. BBlocker initiated  10/2 transferred to floor  10/3 External PW dc'd, laxatives for constipation  10/4 discharged home no PT needs

## 2017-10-03 NOTE — DISCHARGE NOTE ADULT - PLAN OF CARE
Recovery Please follow up with Dr Castañeda in 7-10 days  Please follow up with your PMD and cardiologist in 2 weeks  Shower daily; washing all incisions with soap and water  Record daily weight and glucose levels  Use incentive spirometer every hour

## 2017-10-03 NOTE — DISCHARGE NOTE ADULT - PATIENT PORTAL LINK FT
“You can access the [employer] Patient Portal, offered by [facility], by registering with the following website: [email or web address]”

## 2017-10-03 NOTE — PROGRESS NOTE ADULT - SUBJECTIVE AND OBJECTIVE BOX
VITAL SIGNS    Telemetry:  SR 70-80    T(C): 37.2  HR: 90   BP: 145/74   RR: 18   SpO2: 91%           10-02 @ :01  -  10-03 @ 07:00  --------------------------------------------------------  IN: 880 mL / OUT: 2000 mL / NET: -1120 mL    10-03 @ 07:01  -  10-03 @ 11:09  --------------------------------------------------------  IN: 400 mL / OUT: 0 mL / NET: 400 mL     Daily Weight in k.9   Admit Wt: 92.4 kg                   MEDICATIONS  ALPRAZolam 0.25 milliGRAM(s) Oral at bedtime PRN  aspirin 325 milliGRAM(s) Oral daily  atorvastatin 40 milliGRAM(s) Oral at bedtime  docusate sodium 100 milliGRAM(s) Oral three times a day  enoxaparin Injectable 40 milliGRAM(s) SubCutaneous daily  insulin glargine Injectable (LANTUS) 60 Unit(s) SubCutaneous every morning  insulin lispro Injectable (HumaLOG) 8 Unit(s) SubCutaneous three times a day before meals  metoprolol 50 milliGRAM(s) Oral two times a day  oxyCODONE    5 mG/acetaminophen 325 mG 1 Tablet(s) Oral every 6 hours PRN  pantoprazole    Tablet 40 milliGRAM(s) Oral before breakfast    PHYSICAL EXAM    Subjective: c/o constipation  Neurology: alert and oriented x 3, nonfocal, no gross deficits  CV : S1S2  Sternal Wound :  CDI , Stable  Lungs: CTA b/l  Abdomen: soft, NT,ND, (- )BM  :  voiding  Extremities:  -c/c/e left svg site c/d/i VITAL SIGNS    Telemetry:  SR 70-80    T(C): 37.2  HR: 90   BP: 145/74   RR: 18   SpO2: 91%           10-02 @ :01  -  10-03 @ 07:00  --------------------------------------------------------  IN: 880 mL / OUT: 2000 mL / NET: -1120 mL    10-03 @ 07:01  -  10-03 @ 11:09  --------------------------------------------------------  IN: 400 mL / OUT: 0 mL / NET: 400 mL    Daily Weight in k.9   Admit Wt: 92.4 kg    MEDICATIONS  ALPRAZolam 0.25 milliGRAM(s) Oral at bedtime PRN  aspirin 325 milliGRAM(s) Oral daily  atorvastatin 40 milliGRAM(s) Oral at bedtime  docusate sodium 100 milliGRAM(s) Oral three times a day  enoxaparin Injectable 40 milliGRAM(s) SubCutaneous daily  insulin glargine Injectable (LANTUS) 60 Unit(s) SubCutaneous every morning  insulin lispro Injectable (HumaLOG) 8 Unit(s) SubCutaneous three times a day before meals  metoprolol 50 milliGRAM(s) Oral two times a day  oxyCODONE    5 mG/acetaminophen 325 mG 1 Tablet(s) Oral every 6 hours PRN  pantoprazole    Tablet 40 milliGRAM(s) Oral before breakfast    PHYSICAL EXAM    Subjective: c/o constipation  Neurology: alert and oriented x 3, nonfocal, no gross deficits  CV : S1S2  Sternal Wound :  CDI , Stable  Lungs: CTA b/l  Abdomen: soft, NT,ND, (- )BM  :  voiding  Extremities:  -c/c/e left svg site c/d/i

## 2017-10-03 NOTE — DISCHARGE NOTE ADULT - CARE PROVIDER_API CALL
King Castañeda), Thoracic and Cardiac Surgery  [address] [place] [address]  Phone: [phone]  Fax: [phone]

## 2017-10-03 NOTE — DISCHARGE NOTE ADULT - OTHER SIGNIFICANT FINDINGS
VITAL SIGNS    Telemetry:  SR 70-90  T(C): 36.7 (10-04-17 @ 05:00), Max: 37.7 (10-03-17 @ 13:31)  HR: 79 (10-04-17 @ 05:00) (79 - 97)  BP: 128/71 (10-04-17 @ 05:00) (124/57 - 139/68)  RR: 18 (10-04-17 @ 05:00) (18 - 18)  SpO2: 95% (10-04-17 @ 05:00) (93% - 95%)            10-03 @ 07:01  -  10-04 @ 07:00  --------------------------------------------------------  IN: 800 mL / OUT: 1000 mL / NET: -200 mL    Daily Weight in k.3 (04 Oct 2017 07:16)  Admit Wt: Drug Dosing Weight  Height (cm): 185.42 (29 Sep 2017 10:42)  Weight (kg): 92.4 (29 Sep 2017 08:36)    PHYSICAL EXAM    Subjective: No complaints offered  Neurology: alert and oriented x 3, nonfocal, no gross deficits  CV : S1S2  Sternal Wound :  CDI , Stable  Lungs: CTA b/l  Abdomen: soft, NT,ND, (+ )BM  :  voiding  Extremities: -c/c/e     LABS  10-04    140  |  104  |  12  ----------------------------<  220<H>  4.6   |  23  |  0.79    Ca    8.9      04 Oct 2017 06:03                                   10.4   5.6   )-----------( 254      ( 04 Oct 2017 06:03 )             30.2

## 2017-10-03 NOTE — PROGRESS NOTE ADULT - ASSESSMENT
61 yo male PMH DM 1 managed by Dr. Roberson A1C 7.7, HTN, RBBB  s/p C4L  9/29    post op pressors support . Endocrinology consulted for Hgb A1C 7.4   10/1   transferred to sdu. BBlocker initiated  10/2 transferred to floor  10/3 External PW dc'd, laxatives for constipation

## 2017-10-03 NOTE — DISCHARGE NOTE ADULT - MEDICATION SUMMARY - MEDICATIONS TO TAKE
I will START or STAY ON the medications listed below when I get home from the hospital:    aspirin 81 mg oral tablet  -- 1 tab(s) by mouth once a day  -- Indication: For antiplatelet    oxyCODONE-acetaminophen 5 mg-325 mg oral tablet  -- 1 tab(s) by mouth every 6 hours, As needed, Moderate Pain (4 - 6) MDD:four tabs  -- Indication: For pain control as needed    insulin glargine  -- 60 unit(s) subcutaneous once a day (at bedtime)  -- Indication: For glucose control    NovoLOG 100 units/mL subcutaneous solution  -- 8 unit(s) subcutaneous 3 times a day  -- Indication: For glucose control    atorvastatin 40 mg oral tablet  -- 1 tab(s) by mouth once a day (at bedtime)  -- Indication: For antihyperlipidemic    metoprolol tartrate 50 mg oral tablet  -- 1 tab(s) by mouth 2 times a day  -- Indication: For Cardiovascular agent    docusate sodium 100 mg oral capsule  -- 1 cap(s) by mouth 3 times a day, As Needed -for constipation   -- Indication: For Stool softener    pantoprazole 40 mg oral delayed release tablet  -- 1 tab(s) by mouth once a day (before a meal)  -- Indication: For Dyspepsia

## 2017-10-03 NOTE — DISCHARGE NOTE ADULT - HOME CARE AGENCY
[facility] [phone] Visiting nurse to call to arrange home care visit for 1-2 days after discharge. Please call home care agency with any questions or concerns.

## 2017-10-03 NOTE — PROGRESS NOTE ADULT - PROBLEM SELECTOR PLAN 1
Asa, Statin, B-blocker, Chest PT,  Incentive spirometry, wound care and assessment.  Ambulate. Anticipate discharge home w/PT Wednesday  Shower pod #5 Asa, Statin, B-blocker, Chest PT,  Incentive spirometry, wound care and assessment.  Ambulate. Anticipate discharge home no PT needs Wednesday  Shower pod #5

## 2017-10-03 NOTE — DISCHARGE NOTE ADULT - CARE PLAN
Principal Discharge DX:	S/P CABG x 4  Goal:	Recovery  Instructions for follow-up, activity and diet:	Please follow up with Dr Castañeda in 7-10 days  Please follow up with your PMD and cardiologist in 2 weeks  Shower daily; washing all incisions with soap and water  Record daily weight and glucose levels  Use incentive spirometer every hour

## 2017-10-03 NOTE — PROGRESS NOTE ADULT - PROBLEM SELECTOR PROBLEM 2
Type 1 diabetes mellitus with other circulatory complication
Uncontrolled type 1 diabetes mellitus with other circulatory complication

## 2017-10-04 VITALS — WEIGHT: 212.31 LBS

## 2017-10-04 LAB
ANION GAP SERPL CALC-SCNC: 13 MMOL/L — SIGNIFICANT CHANGE UP (ref 5–17)
BUN SERPL-MCNC: 12 MG/DL — SIGNIFICANT CHANGE UP (ref 7–23)
CALCIUM SERPL-MCNC: 8.9 MG/DL — SIGNIFICANT CHANGE UP (ref 8.4–10.5)
CHLORIDE SERPL-SCNC: 104 MMOL/L — SIGNIFICANT CHANGE UP (ref 96–108)
CO2 SERPL-SCNC: 23 MMOL/L — SIGNIFICANT CHANGE UP (ref 22–31)
CREAT SERPL-MCNC: 0.79 MG/DL — SIGNIFICANT CHANGE UP (ref 0.5–1.3)
GLUCOSE SERPL-MCNC: 220 MG/DL — HIGH (ref 70–99)
HCT VFR BLD CALC: 30.2 % — LOW (ref 39–50)
HGB BLD-MCNC: 10.4 G/DL — LOW (ref 13–17)
MCHC RBC-ENTMCNC: 32 PG — SIGNIFICANT CHANGE UP (ref 27–34)
MCHC RBC-ENTMCNC: 34.5 GM/DL — SIGNIFICANT CHANGE UP (ref 32–36)
MCV RBC AUTO: 92.5 FL — SIGNIFICANT CHANGE UP (ref 80–100)
PLATELET # BLD AUTO: 254 K/UL — SIGNIFICANT CHANGE UP (ref 150–400)
POTASSIUM SERPL-MCNC: 4.6 MMOL/L — SIGNIFICANT CHANGE UP (ref 3.5–5.3)
POTASSIUM SERPL-SCNC: 4.6 MMOL/L — SIGNIFICANT CHANGE UP (ref 3.5–5.3)
RBC # BLD: 3.26 M/UL — LOW (ref 4.2–5.8)
RBC # FLD: 11.1 % — SIGNIFICANT CHANGE UP (ref 10.3–14.5)
SODIUM SERPL-SCNC: 140 MMOL/L — SIGNIFICANT CHANGE UP (ref 135–145)
WBC # BLD: 5.6 K/UL — SIGNIFICANT CHANGE UP (ref 3.8–10.5)
WBC # FLD AUTO: 5.6 K/UL — SIGNIFICANT CHANGE UP (ref 3.8–10.5)

## 2017-10-04 PROCEDURE — 97116 GAIT TRAINING THERAPY: CPT

## 2017-10-04 PROCEDURE — 97162 PT EVAL MOD COMPLEX 30 MIN: CPT

## 2017-10-04 PROCEDURE — 84100 ASSAY OF PHOSPHORUS: CPT

## 2017-10-04 PROCEDURE — C1889: CPT

## 2017-10-04 PROCEDURE — 84295 ASSAY OF SERUM SODIUM: CPT

## 2017-10-04 PROCEDURE — 85610 PROTHROMBIN TIME: CPT

## 2017-10-04 PROCEDURE — 86850 RBC ANTIBODY SCREEN: CPT

## 2017-10-04 PROCEDURE — P9045: CPT

## 2017-10-04 PROCEDURE — 99152 MOD SED SAME PHYS/QHP 5/>YRS: CPT

## 2017-10-04 PROCEDURE — C8929: CPT

## 2017-10-04 PROCEDURE — 84480 ASSAY TRIIODOTHYRONINE (T3): CPT

## 2017-10-04 PROCEDURE — 82550 ASSAY OF CK (CPK): CPT

## 2017-10-04 PROCEDURE — 71045 X-RAY EXAM CHEST 1 VIEW: CPT

## 2017-10-04 PROCEDURE — C1729: CPT

## 2017-10-04 PROCEDURE — 82465 ASSAY BLD/SERUM CHOLESTEROL: CPT

## 2017-10-04 PROCEDURE — 84132 ASSAY OF SERUM POTASSIUM: CPT

## 2017-10-04 PROCEDURE — 94002 VENT MGMT INPAT INIT DAY: CPT

## 2017-10-04 PROCEDURE — 86902 BLOOD TYPE ANTIGEN DONOR EA: CPT

## 2017-10-04 PROCEDURE — 86900 BLOOD TYPING SEROLOGIC ABO: CPT

## 2017-10-04 PROCEDURE — 87641 MR-STAPH DNA AMP PROBE: CPT

## 2017-10-04 PROCEDURE — 83605 ASSAY OF LACTIC ACID: CPT

## 2017-10-04 PROCEDURE — 82947 ASSAY GLUCOSE BLOOD QUANT: CPT

## 2017-10-04 PROCEDURE — 85730 THROMBOPLASTIN TIME PARTIAL: CPT

## 2017-10-04 PROCEDURE — P9016: CPT

## 2017-10-04 PROCEDURE — 86891 AUTOLOGOUS BLOOD OP SALVAGE: CPT

## 2017-10-04 PROCEDURE — 84443 ASSAY THYROID STIM HORMONE: CPT

## 2017-10-04 PROCEDURE — 86870 RBC ANTIBODY IDENTIFICATION: CPT

## 2017-10-04 PROCEDURE — 82435 ASSAY OF BLOOD CHLORIDE: CPT

## 2017-10-04 PROCEDURE — 86901 BLOOD TYPING SEROLOGIC RH(D): CPT

## 2017-10-04 PROCEDURE — P9047: CPT

## 2017-10-04 PROCEDURE — C1894: CPT

## 2017-10-04 PROCEDURE — 80076 HEPATIC FUNCTION PANEL: CPT

## 2017-10-04 PROCEDURE — 82330 ASSAY OF CALCIUM: CPT

## 2017-10-04 PROCEDURE — 83036 HEMOGLOBIN GLYCOSYLATED A1C: CPT

## 2017-10-04 PROCEDURE — 86905 BLOOD TYPING RBC ANTIGENS: CPT

## 2017-10-04 PROCEDURE — 87640 STAPH A DNA AMP PROBE: CPT

## 2017-10-04 PROCEDURE — 83520 IMMUNOASSAY QUANT NOS NONAB: CPT

## 2017-10-04 PROCEDURE — 82553 CREATINE MB FRACTION: CPT

## 2017-10-04 PROCEDURE — 93005 ELECTROCARDIOGRAM TRACING: CPT

## 2017-10-04 PROCEDURE — 93458 L HRT ARTERY/VENTRICLE ANGIO: CPT

## 2017-10-04 PROCEDURE — 86880 COOMBS TEST DIRECT: CPT

## 2017-10-04 PROCEDURE — 84484 ASSAY OF TROPONIN QUANT: CPT

## 2017-10-04 PROCEDURE — 85014 HEMATOCRIT: CPT

## 2017-10-04 PROCEDURE — 84436 ASSAY OF TOTAL THYROXINE: CPT

## 2017-10-04 PROCEDURE — 80048 BASIC METABOLIC PNL TOTAL CA: CPT

## 2017-10-04 PROCEDURE — 80053 COMPREHEN METABOLIC PANEL: CPT

## 2017-10-04 PROCEDURE — C1751: CPT

## 2017-10-04 PROCEDURE — 83880 ASSAY OF NATRIURETIC PEPTIDE: CPT

## 2017-10-04 PROCEDURE — 93880 EXTRACRANIAL BILAT STUDY: CPT

## 2017-10-04 PROCEDURE — 82565 ASSAY OF CREATININE: CPT

## 2017-10-04 PROCEDURE — 81003 URINALYSIS AUTO W/O SCOPE: CPT

## 2017-10-04 PROCEDURE — 85027 COMPLETE CBC AUTOMATED: CPT

## 2017-10-04 PROCEDURE — 82803 BLOOD GASES ANY COMBINATION: CPT

## 2017-10-04 PROCEDURE — C1769: CPT

## 2017-10-04 PROCEDURE — 86922 COMPATIBILITY TEST ANTIGLOB: CPT

## 2017-10-04 PROCEDURE — C1887: CPT

## 2017-10-04 RX ORDER — PANTOPRAZOLE SODIUM 20 MG/1
1 TABLET, DELAYED RELEASE ORAL
Qty: 30 | Refills: 0 | OUTPATIENT
Start: 2017-10-04 | End: 2017-11-03

## 2017-10-04 RX ORDER — INSULIN GLARGINE 100 [IU]/ML
60 INJECTION, SOLUTION SUBCUTANEOUS
Qty: 0 | Refills: 0 | COMMUNITY
Start: 2017-10-04

## 2017-10-04 RX ORDER — DOCUSATE SODIUM 100 MG
1 CAPSULE ORAL
Qty: 90 | Refills: 0 | OUTPATIENT
Start: 2017-10-04 | End: 2017-11-03

## 2017-10-04 RX ORDER — ATORVASTATIN CALCIUM 80 MG/1
1 TABLET, FILM COATED ORAL
Qty: 30 | Refills: 0 | OUTPATIENT
Start: 2017-10-04 | End: 2017-11-03

## 2017-10-04 RX ORDER — METOPROLOL TARTRATE 50 MG
1 TABLET ORAL
Qty: 60 | Refills: 0 | OUTPATIENT
Start: 2017-10-04 | End: 2017-11-03

## 2017-10-04 RX ORDER — INSULIN ASPART 100 [IU]/ML
8 INJECTION, SOLUTION SUBCUTANEOUS
Qty: 1 | Refills: 0 | OUTPATIENT
Start: 2017-10-04 | End: 2017-11-03

## 2017-10-04 RX ORDER — ASPIRIN/CALCIUM CARB/MAGNESIUM 324 MG
1 TABLET ORAL
Qty: 30 | Refills: 0 | OUTPATIENT
Start: 2017-10-04 | End: 2017-11-03

## 2017-10-04 RX ADMIN — Medication 100 MILLIGRAM(S): at 05:51

## 2017-10-04 RX ADMIN — ENOXAPARIN SODIUM 40 MILLIGRAM(S): 100 INJECTION SUBCUTANEOUS at 11:37

## 2017-10-04 RX ADMIN — Medication 100 MILLIGRAM(S): at 13:58

## 2017-10-04 RX ADMIN — Medication 0.25 MILLIGRAM(S): at 01:10

## 2017-10-04 RX ADMIN — PANTOPRAZOLE SODIUM 40 MILLIGRAM(S): 20 TABLET, DELAYED RELEASE ORAL at 05:52

## 2017-10-04 RX ADMIN — Medication 4: at 07:31

## 2017-10-04 RX ADMIN — POLYETHYLENE GLYCOL 3350 17 GRAM(S): 17 POWDER, FOR SOLUTION ORAL at 11:37

## 2017-10-04 RX ADMIN — Medication 325 MILLIGRAM(S): at 11:37

## 2017-10-04 RX ADMIN — Medication 8 UNIT(S): at 11:37

## 2017-10-04 RX ADMIN — INSULIN GLARGINE 60 UNIT(S): 100 INJECTION, SOLUTION SUBCUTANEOUS at 07:31

## 2017-10-04 RX ADMIN — Medication 8 UNIT(S): at 07:31

## 2017-10-04 RX ADMIN — Medication 50 MILLIGRAM(S): at 05:51

## 2017-10-13 ENCOUNTER — RECORD ABSTRACTING (OUTPATIENT)
Age: 62
End: 2017-10-13

## 2017-10-13 DIAGNOSIS — Z82.49 FAMILY HISTORY OF ISCHEMIC HEART DISEASE AND OTHER DISEASES OF THE CIRCULATORY SYSTEM: ICD-10-CM

## 2017-10-13 DIAGNOSIS — Z98.890 OTHER SPECIFIED POSTPROCEDURAL STATES: ICD-10-CM

## 2017-10-16 ENCOUNTER — RESULT REVIEW (OUTPATIENT)
Age: 62
End: 2017-10-16

## 2017-10-24 ENCOUNTER — APPOINTMENT (OUTPATIENT)
Dept: CARDIOTHORACIC SURGERY | Facility: CLINIC | Age: 62
End: 2017-10-24
Payer: COMMERCIAL

## 2017-10-24 ENCOUNTER — OTHER (OUTPATIENT)
Age: 62
End: 2017-10-24

## 2017-10-24 VITALS
DIASTOLIC BLOOD PRESSURE: 77 MMHG | WEIGHT: 195 LBS | HEART RATE: 79 BPM | SYSTOLIC BLOOD PRESSURE: 146 MMHG | RESPIRATION RATE: 15 BRPM | HEIGHT: 73 IN | TEMPERATURE: 98.9 F | OXYGEN SATURATION: 96 % | BODY MASS INDEX: 25.84 KG/M2

## 2017-10-24 PROCEDURE — 99024 POSTOP FOLLOW-UP VISIT: CPT

## 2017-10-24 RX ORDER — DOCUSATE SODIUM 100 MG/1
100 CAPSULE, LIQUID FILLED ORAL 3 TIMES DAILY
Refills: 0 | Status: DISCONTINUED | COMMUNITY
End: 2017-10-24

## 2017-10-24 RX ORDER — PANTOPRAZOLE 40 MG/1
40 TABLET, DELAYED RELEASE ORAL DAILY
Refills: 0 | Status: DISCONTINUED | COMMUNITY
End: 2017-10-24

## 2017-11-02 ENCOUNTER — APPOINTMENT (OUTPATIENT)
Dept: FAMILY MEDICINE | Facility: CLINIC | Age: 62
End: 2017-11-02
Payer: COMMERCIAL

## 2017-11-02 VITALS
HEART RATE: 67 BPM | OXYGEN SATURATION: 98 % | HEIGHT: 73 IN | WEIGHT: 201.5 LBS | SYSTOLIC BLOOD PRESSURE: 118 MMHG | BODY MASS INDEX: 26.71 KG/M2 | DIASTOLIC BLOOD PRESSURE: 70 MMHG

## 2017-11-02 VITALS — SYSTOLIC BLOOD PRESSURE: 120 MMHG | DIASTOLIC BLOOD PRESSURE: 70 MMHG | RESPIRATION RATE: 16 BRPM | HEART RATE: 72 BPM

## 2017-11-02 PROCEDURE — 90736 HZV VACCINE LIVE SUBQ: CPT

## 2017-11-02 PROCEDURE — 99214 OFFICE O/P EST MOD 30 MIN: CPT | Mod: 25

## 2017-11-02 PROCEDURE — 36415 COLL VENOUS BLD VENIPUNCTURE: CPT

## 2017-11-02 PROCEDURE — 90471 IMMUNIZATION ADMIN: CPT

## 2017-11-02 RX ORDER — OXYCODONE AND ACETAMINOPHEN 5; 325 MG/1; MG/1
5-325 TABLET ORAL
Qty: 30 | Refills: 0 | Status: DISCONTINUED | OUTPATIENT
End: 2017-11-02

## 2017-11-04 LAB
ALBUMIN SERPL ELPH-MCNC: 4.1 G/DL
ALP BLD-CCNC: 109 U/L
ALT SERPL-CCNC: 19 U/L
ANION GAP SERPL CALC-SCNC: 13 MMOL/L
APPEARANCE: CLEAR
AST SERPL-CCNC: 21 U/L
BACTERIA: NEGATIVE
BASOPHILS # BLD AUTO: 0.03 K/UL
BASOPHILS NFR BLD AUTO: 0.4 %
BILIRUB SERPL-MCNC: 0.6 MG/DL
BILIRUBIN URINE: NEGATIVE
BLOOD URINE: NEGATIVE
BUN SERPL-MCNC: 16 MG/DL
CALCIUM SERPL-MCNC: 9.9 MG/DL
CHLORIDE SERPL-SCNC: 100 MMOL/L
CHOLEST SERPL-MCNC: 86 MG/DL
CHOLEST/HDLC SERPL: 3.1 RATIO
CO2 SERPL-SCNC: 26 MMOL/L
COLOR: YELLOW
CREAT SERPL-MCNC: 1.06 MG/DL
CREAT SPEC-SCNC: 97 MG/DL
EOSINOPHIL # BLD AUTO: 0.18 K/UL
EOSINOPHIL NFR BLD AUTO: 2.4 %
GLUCOSE QUALITATIVE U: NEGATIVE MG/DL
GLUCOSE SERPL-MCNC: 82 MG/DL
HBA1C MFR BLD HPLC: 6.2 %
HCT VFR BLD CALC: 40.7 %
HDLC SERPL-MCNC: 28 MG/DL
HGB BLD-MCNC: 13 G/DL
IMM GRANULOCYTES NFR BLD AUTO: 0.1 %
KETONES URINE: NEGATIVE
LDLC SERPL CALC-MCNC: 42 MG/DL
LEUKOCYTE ESTERASE URINE: NEGATIVE
LYMPHOCYTES # BLD AUTO: 2.5 K/UL
LYMPHOCYTES NFR BLD AUTO: 33.5 %
MAN DIFF?: NORMAL
MCHC RBC-ENTMCNC: 28.5 PG
MCHC RBC-ENTMCNC: 31.9 GM/DL
MCV RBC AUTO: 89.3 FL
MICROALBUMIN 24H UR DL<=1MG/L-MCNC: 0.5 MG/DL
MICROALBUMIN/CREAT 24H UR-RTO: 5 MG/G
MICROSCOPIC-UA: NORMAL
MONOCYTES # BLD AUTO: 0.56 K/UL
MONOCYTES NFR BLD AUTO: 7.5 %
NEUTROPHILS # BLD AUTO: 4.18 K/UL
NEUTROPHILS NFR BLD AUTO: 56.1 %
NITRITE URINE: NEGATIVE
PH URINE: 6.5
PLATELET # BLD AUTO: 313 K/UL
POTASSIUM SERPL-SCNC: 4.5 MMOL/L
PROT SERPL-MCNC: 7.2 G/DL
PROTEIN URINE: NEGATIVE MG/DL
RBC # BLD: 4.56 M/UL
RBC # FLD: 13.8 %
RED BLOOD CELLS URINE: 1 /HPF
SODIUM SERPL-SCNC: 139 MMOL/L
SPECIFIC GRAVITY URINE: 1.02
SQUAMOUS EPITHELIAL CELLS: 0 /HPF
T4 SERPL-MCNC: 6.7 UG/DL
TRIGL SERPL-MCNC: 81 MG/DL
TSH SERPL-ACNC: 6.48 UIU/ML
URATE SERPL-MCNC: 4.4 MG/DL
UROBILINOGEN URINE: NEGATIVE MG/DL
WBC # FLD AUTO: 7.46 K/UL
WHITE BLOOD CELLS URINE: 0 /HPF

## 2017-11-08 RX ORDER — INSULIN ASPART 100 [IU]/ML
8 INJECTION, SOLUTION SUBCUTANEOUS
Qty: 0 | Refills: 0 | COMMUNITY

## 2017-11-08 RX ORDER — ASPIRIN/CALCIUM CARB/MAGNESIUM 324 MG
1 TABLET ORAL
Qty: 0 | Refills: 0 | COMMUNITY

## 2017-11-08 RX ORDER — INSULIN GLARGINE 100 [IU]/ML
60 INJECTION, SOLUTION SUBCUTANEOUS
Qty: 0 | Refills: 0 | COMMUNITY

## 2017-11-08 RX ORDER — LOSARTAN POTASSIUM 100 MG/1
1 TABLET, FILM COATED ORAL
Qty: 0 | Refills: 0 | COMMUNITY

## 2017-11-13 ENCOUNTER — TRANSCRIPTION ENCOUNTER (OUTPATIENT)
Age: 62
End: 2017-11-13

## 2017-11-17 ENCOUNTER — APPOINTMENT (OUTPATIENT)
Dept: CARDIOLOGY | Facility: CLINIC | Age: 62
End: 2017-11-17
Payer: COMMERCIAL

## 2017-11-17 ENCOUNTER — NON-APPOINTMENT (OUTPATIENT)
Age: 62
End: 2017-11-17

## 2017-11-17 VITALS
BODY MASS INDEX: 25.84 KG/M2 | WEIGHT: 195 LBS | DIASTOLIC BLOOD PRESSURE: 71 MMHG | HEART RATE: 64 BPM | HEIGHT: 73 IN | SYSTOLIC BLOOD PRESSURE: 122 MMHG | OXYGEN SATURATION: 97 %

## 2017-11-17 DIAGNOSIS — Z87.898 PERSONAL HISTORY OF OTHER SPECIFIED CONDITIONS: ICD-10-CM

## 2017-11-17 DIAGNOSIS — I10 ESSENTIAL (PRIMARY) HYPERTENSION: ICD-10-CM

## 2017-11-17 DIAGNOSIS — Z86.79 PERSONAL HISTORY OF OTHER DISEASES OF THE CIRCULATORY SYSTEM: ICD-10-CM

## 2017-11-17 DIAGNOSIS — E11.9 TYPE 2 DIABETES MELLITUS W/OUT COMPLICATIONS: ICD-10-CM

## 2017-11-17 PROBLEM — R07.9 CHEST PAIN, UNSPECIFIED: Chronic | Status: ACTIVE | Noted: 2017-09-28

## 2017-11-17 PROBLEM — I45.10 UNSPECIFIED RIGHT BUNDLE-BRANCH BLOCK: Chronic | Status: ACTIVE | Noted: 2017-09-28

## 2017-11-17 PROCEDURE — 93000 ELECTROCARDIOGRAM COMPLETE: CPT

## 2017-11-17 PROCEDURE — 99215 OFFICE O/P EST HI 40 MIN: CPT | Mod: 25

## 2017-11-17 RX ORDER — ASPIRIN ENTERIC COATED TABLETS 81 MG 81 MG/1
81 TABLET, DELAYED RELEASE ORAL
Refills: 0 | Status: COMPLETED | COMMUNITY
End: 2017-11-17

## 2017-11-17 RX ORDER — METOPROLOL SUCCINATE 25 MG/1
25 TABLET, EXTENDED RELEASE ORAL DAILY
Qty: 30 | Refills: 1 | Status: COMPLETED | COMMUNITY
Start: 2017-09-27 | End: 2017-11-17

## 2017-11-17 RX ORDER — METOPROLOL TARTRATE 50 MG/1
50 TABLET, FILM COATED ORAL
Qty: 60 | Refills: 0 | Status: DISCONTINUED | COMMUNITY
End: 2017-11-17

## 2017-12-12 ENCOUNTER — APPOINTMENT (OUTPATIENT)
Dept: FAMILY MEDICINE | Facility: CLINIC | Age: 62
End: 2017-12-12
Payer: COMMERCIAL

## 2017-12-12 VITALS
HEIGHT: 73 IN | BODY MASS INDEX: 26.56 KG/M2 | DIASTOLIC BLOOD PRESSURE: 68 MMHG | SYSTOLIC BLOOD PRESSURE: 124 MMHG | OXYGEN SATURATION: 98 % | RESPIRATION RATE: 16 BRPM | WEIGHT: 200.38 LBS | HEART RATE: 76 BPM

## 2017-12-12 VITALS — DIASTOLIC BLOOD PRESSURE: 80 MMHG | HEART RATE: 72 BPM | RESPIRATION RATE: 16 BRPM | SYSTOLIC BLOOD PRESSURE: 112 MMHG

## 2017-12-12 PROCEDURE — 99396 PREV VISIT EST AGE 40-64: CPT

## 2018-03-14 ENCOUNTER — NON-APPOINTMENT (OUTPATIENT)
Age: 63
End: 2018-03-14

## 2018-03-14 ENCOUNTER — APPOINTMENT (OUTPATIENT)
Dept: CARDIOLOGY | Facility: CLINIC | Age: 63
End: 2018-03-14
Payer: COMMERCIAL

## 2018-03-14 VITALS
DIASTOLIC BLOOD PRESSURE: 72 MMHG | OXYGEN SATURATION: 98 % | WEIGHT: 195 LBS | HEIGHT: 73 IN | SYSTOLIC BLOOD PRESSURE: 119 MMHG | BODY MASS INDEX: 25.84 KG/M2 | HEART RATE: 62 BPM

## 2018-03-14 PROCEDURE — 93000 ELECTROCARDIOGRAM COMPLETE: CPT

## 2018-03-14 PROCEDURE — 99214 OFFICE O/P EST MOD 30 MIN: CPT | Mod: 25

## 2018-07-25 ENCOUNTER — APPOINTMENT (OUTPATIENT)
Dept: FAMILY MEDICINE | Facility: CLINIC | Age: 63
End: 2018-07-25
Payer: COMMERCIAL

## 2018-07-25 VITALS
SYSTOLIC BLOOD PRESSURE: 130 MMHG | DIASTOLIC BLOOD PRESSURE: 80 MMHG | WEIGHT: 204.38 LBS | HEIGHT: 73 IN | BODY MASS INDEX: 27.09 KG/M2

## 2018-07-25 VITALS — SYSTOLIC BLOOD PRESSURE: 110 MMHG | HEART RATE: 60 BPM | DIASTOLIC BLOOD PRESSURE: 70 MMHG | RESPIRATION RATE: 16 BRPM

## 2018-07-25 PROCEDURE — 99214 OFFICE O/P EST MOD 30 MIN: CPT | Mod: 25

## 2018-07-25 PROCEDURE — 36415 COLL VENOUS BLD VENIPUNCTURE: CPT

## 2018-07-25 RX ORDER — INSULIN GLARGINE 100 [IU]/ML
100 INJECTION, SOLUTION SUBCUTANEOUS
Refills: 0 | Status: COMPLETED | COMMUNITY
End: 2018-07-25

## 2018-07-25 NOTE — HISTORY OF PRESENT ILLNESS
[Coronary Artery Disease] : Coronary Artery Disease [Diabetes Mellitus] : Diabetes Mellitus [Hyperlipidemia] : Hyperlipidemia [Hypertension] : Hypertension [Check glucose ___ x/day] : Patient checks  blood glucose [unfilled]  times a day [Fasting:  ___] : Fasting Blood Sugar: [unfilled] mg/dL [Regular podiatrist visits] : Patient had regular podiatrist visits [Understanding of foot care] : Patient expressed understanding of foot care [Most Recent A1C: ___] : Most recent A1C was [unfilled] [Does not check BP] : The patient is not checking blood pressure [120/80] : Target blood pressure is 120/80 [Doing Well] : Patient is doing well [High Intensity therapy] : Patient is currently on high intensity statin therapy [Well Controlled] : Overall status has been well controlled [Retinopathy] : No retinopathy [Symptoms Limit Activities] : Symptoms do not limit ~his/her~ activities

## 2018-07-25 NOTE — REVIEW OF SYSTEMS
[Fever] : no fever [Sore Throat] : no sore throat [Chest Pain] : no chest pain [Shortness Of Breath] : no shortness of breath [Abdominal Pain] : no abdominal pain [Nausea] : no nausea [Dizziness] : no dizziness [Easy Bleeding] : no easy bleeding

## 2018-07-25 NOTE — ASSESSMENT
[FreeTextEntry1] : medically stable  continue all meds\par  Home Suture Removal Text: Patient was provided instructions on removing sutures and will remove their sutures at home.  If they have any questions or difficulties they will call the office.

## 2018-07-26 LAB
ALBUMIN SERPL ELPH-MCNC: 4.4 G/DL
ALP BLD-CCNC: 106 U/L
ALT SERPL-CCNC: 26 U/L
ANION GAP SERPL CALC-SCNC: 12 MMOL/L
APPEARANCE: CLEAR
AST SERPL-CCNC: 28 U/L
BACTERIA: NEGATIVE
BASOPHILS # BLD AUTO: 0.03 K/UL
BASOPHILS NFR BLD AUTO: 0.4 %
BILIRUB SERPL-MCNC: 1 MG/DL
BILIRUBIN URINE: NEGATIVE
BLOOD URINE: NEGATIVE
BUN SERPL-MCNC: 7 MG/DL
CALCIUM SERPL-MCNC: 9.7 MG/DL
CHLORIDE SERPL-SCNC: 101 MMOL/L
CHOLEST SERPL-MCNC: 76 MG/DL
CHOLEST/HDLC SERPL: 2.5 RATIO
CO2 SERPL-SCNC: 26 MMOL/L
COLOR: YELLOW
CREAT SERPL-MCNC: 0.89 MG/DL
EOSINOPHIL # BLD AUTO: 0.1 K/UL
EOSINOPHIL NFR BLD AUTO: 1.3 %
GLUCOSE QUALITATIVE U: 500 MG/DL
GLUCOSE SERPL-MCNC: 145 MG/DL
HBA1C MFR BLD HPLC: 6.9 %
HCT VFR BLD CALC: 44.5 %
HDLC SERPL-MCNC: 30 MG/DL
HGB BLD-MCNC: 14.5 G/DL
HYALINE CASTS: 0 /LPF
IMM GRANULOCYTES NFR BLD AUTO: 0.1 %
KETONES URINE: NEGATIVE
LDLC SERPL CALC-MCNC: 32 MG/DL
LEUKOCYTE ESTERASE URINE: NEGATIVE
LYMPHOCYTES # BLD AUTO: 2.22 K/UL
LYMPHOCYTES NFR BLD AUTO: 28.8 %
MAN DIFF?: NORMAL
MCHC RBC-ENTMCNC: 29.8 PG
MCHC RBC-ENTMCNC: 32.6 GM/DL
MCV RBC AUTO: 91.4 FL
MICROSCOPIC-UA: NORMAL
MONOCYTES # BLD AUTO: 0.51 K/UL
MONOCYTES NFR BLD AUTO: 6.6 %
NEUTROPHILS # BLD AUTO: 4.83 K/UL
NEUTROPHILS NFR BLD AUTO: 62.8 %
NITRITE URINE: NEGATIVE
PH URINE: 7
PLATELET # BLD AUTO: 303 K/UL
POTASSIUM SERPL-SCNC: 4.5 MMOL/L
PROT SERPL-MCNC: 7.1 G/DL
PROTEIN URINE: NEGATIVE MG/DL
RBC # BLD: 4.87 M/UL
RBC # FLD: 12.6 %
RED BLOOD CELLS URINE: 2 /HPF
SODIUM SERPL-SCNC: 139 MMOL/L
SPECIFIC GRAVITY URINE: 1.01
SQUAMOUS EPITHELIAL CELLS: 0 /HPF
T4 SERPL-MCNC: 7.7 UG/DL
TRIGL SERPL-MCNC: 71 MG/DL
TSH SERPL-ACNC: 4 UIU/ML
URATE SERPL-MCNC: 4 MG/DL
UROBILINOGEN URINE: NEGATIVE MG/DL
WBC # FLD AUTO: 7.7 K/UL
WHITE BLOOD CELLS URINE: 0 /HPF

## 2018-07-27 ENCOUNTER — RX RENEWAL (OUTPATIENT)
Age: 63
End: 2018-07-27

## 2018-07-29 LAB
CREAT SPEC-SCNC: 74 MG/DL
MICROALBUMIN 24H UR DL<=1MG/L-MCNC: <1.2 MG/DL
MICROALBUMIN/CREAT 24H UR-RTO: NORMAL

## 2018-08-06 ENCOUNTER — RX RENEWAL (OUTPATIENT)
Age: 63
End: 2018-08-06

## 2018-09-18 ENCOUNTER — RX RENEWAL (OUTPATIENT)
Age: 63
End: 2018-09-18

## 2018-09-26 ENCOUNTER — APPOINTMENT (OUTPATIENT)
Dept: CARDIOLOGY | Facility: CLINIC | Age: 63
End: 2018-09-26
Payer: COMMERCIAL

## 2018-09-26 ENCOUNTER — NON-APPOINTMENT (OUTPATIENT)
Age: 63
End: 2018-09-26

## 2018-09-26 VITALS
SYSTOLIC BLOOD PRESSURE: 136 MMHG | HEIGHT: 73 IN | DIASTOLIC BLOOD PRESSURE: 76 MMHG | HEART RATE: 58 BPM | WEIGHT: 204 LBS | OXYGEN SATURATION: 96 % | BODY MASS INDEX: 27.04 KG/M2

## 2018-09-26 DIAGNOSIS — Z09 ENCOUNTER FOR FOLLOW-UP EXAMINATION AFTER COMPLETED TREATMENT FOR CONDITIONS OTHER THAN MALIGNANT NEOPLASM: ICD-10-CM

## 2018-09-26 PROCEDURE — 93000 ELECTROCARDIOGRAM COMPLETE: CPT

## 2018-09-26 PROCEDURE — 99214 OFFICE O/P EST MOD 30 MIN: CPT | Mod: 25

## 2018-10-29 ENCOUNTER — RX RENEWAL (OUTPATIENT)
Age: 63
End: 2018-10-29

## 2018-12-02 ENCOUNTER — RX RENEWAL (OUTPATIENT)
Age: 63
End: 2018-12-02

## 2018-12-13 ENCOUNTER — RX RENEWAL (OUTPATIENT)
Age: 63
End: 2018-12-13

## 2018-12-17 ENCOUNTER — APPOINTMENT (OUTPATIENT)
Dept: FAMILY MEDICINE | Facility: CLINIC | Age: 63
End: 2018-12-17
Payer: COMMERCIAL

## 2018-12-17 VITALS — RESPIRATION RATE: 16 BRPM | DIASTOLIC BLOOD PRESSURE: 74 MMHG | HEART RATE: 72 BPM | SYSTOLIC BLOOD PRESSURE: 120 MMHG

## 2018-12-17 VITALS
BODY MASS INDEX: 26.66 KG/M2 | HEIGHT: 73 IN | OXYGEN SATURATION: 98 % | HEART RATE: 76 BPM | SYSTOLIC BLOOD PRESSURE: 122 MMHG | DIASTOLIC BLOOD PRESSURE: 76 MMHG | WEIGHT: 201.13 LBS | TEMPERATURE: 98.5 F

## 2018-12-17 PROCEDURE — 99214 OFFICE O/P EST MOD 30 MIN: CPT

## 2018-12-17 PROCEDURE — 99396 PREV VISIT EST AGE 40-64: CPT | Mod: 25

## 2018-12-17 NOTE — PHYSICAL EXAM
[No Acute Distress] : no acute distress [PERRL] : pupils equal round and reactive to light [Normal Oropharynx] : the oropharynx was normal [Supple] : supple [Clear to Auscultation] : lungs were clear to auscultation bilaterally [Regular Rhythm] : with a regular rhythm [No Edema] : there was no peripheral edema [Soft] : abdomen soft [Non Tender] : non-tender [No HSM] : no HSM [No Joint Swelling] : no joint swelling [No Rash] : no rash [Normal Gait] : normal gait [Normal Insight/Judgement] : insight and judgment were intact [Right Foot Was Examined] : Right foot ~C was examined [Left Foot Was Examined] : left foot ~C was examined [] : with full ROM [de-identified] : right great  toenail fungal

## 2018-12-17 NOTE — HISTORY OF PRESENT ILLNESS
[Coronary Artery Disease] : Coronary Artery Disease [Diabetes Mellitus] : Diabetes Mellitus [Hyperlipidemia] : Hyperlipidemia [Hypertension] : Hypertension [Check glucose ___ x/day] : Patient checks  blood glucose [unfilled]  times a day [Fasting:  ___] : Fasting Blood Sugar: [unfilled] mg/dL [Most Recent A1C: ___] : Most recent A1C was [unfilled] [Checks BP Regularly] : The patient checks ~his/her~ blood pressure regularly [<130/90] : Target blood pressure is  <130/90 [Target goal met] : BP target goal met [Doing Well] : Patient is doing well [Managed with medications] : managed with  medication [High Intensity therapy] : Patient is currently on high intensity statin therapy [Well Controlled] : Overall status has been well controlled [FreeTextEntry6] : feeling well here  for  cpe  [Retinopathy] : No retinopathy [de-identified] : ave  glucose <150  [Symptoms Limit Activities] : Symptoms do not limit ~his/her~ activities [FreeTextEntry1] : exercises   regularly  no health care proxy working  full time live alone works as

## 2018-12-17 NOTE — REVIEW OF SYSTEMS
[Fever] : no fever [Sore Throat] : no sore throat [Chest Pain] : no chest pain [Palpitations] : no palpitations [Shortness Of Breath] : no shortness of breath [Abdominal Pain] : no abdominal pain [Nausea] : no nausea [Dysuria] : no dysuria [Impotence] : impotence [Joint Pain] : no joint pain [Itching] : no itching [Headache] : no headache [Dizziness] : no dizziness [Memory Loss] : no memory loss [Suicidal] : not suicidal [Easy Bleeding] : no easy bleeding

## 2018-12-18 LAB
ALBUMIN SERPL ELPH-MCNC: 4.1 G/DL
ALP BLD-CCNC: 82 U/L
ALT SERPL-CCNC: 23 U/L
ANION GAP SERPL CALC-SCNC: 8 MMOL/L
APPEARANCE: CLEAR
AST SERPL-CCNC: 26 U/L
BACTERIA: NEGATIVE
BASOPHILS # BLD AUTO: 0.03 K/UL
BASOPHILS NFR BLD AUTO: 0.4 %
BILIRUB SERPL-MCNC: 1 MG/DL
BILIRUBIN URINE: NEGATIVE
BLOOD URINE: NEGATIVE
BUN SERPL-MCNC: 7 MG/DL
CALCIUM SERPL-MCNC: 9.6 MG/DL
CHLORIDE SERPL-SCNC: 104 MMOL/L
CHOLEST SERPL-MCNC: 96 MG/DL
CHOLEST/HDLC SERPL: 3.1 RATIO
CO2 SERPL-SCNC: 26 MMOL/L
COLOR: ABNORMAL
CREAT SERPL-MCNC: 0.82 MG/DL
CREAT SPEC-SCNC: 149 MG/DL
EOSINOPHIL # BLD AUTO: 0.1 K/UL
EOSINOPHIL NFR BLD AUTO: 1.3 %
GLUCOSE QUALITATIVE U: 100 MG/DL
GLUCOSE SERPL-MCNC: 133 MG/DL
HBA1C MFR BLD HPLC: 7.8 %
HCT VFR BLD CALC: 45.4 %
HDLC SERPL-MCNC: 31 MG/DL
HGB BLD-MCNC: 15.4 G/DL
HYALINE CASTS: 0 /LPF
IMM GRANULOCYTES NFR BLD AUTO: 0.3 %
KETONES URINE: NEGATIVE
LDLC SERPL CALC-MCNC: 45 MG/DL
LEUKOCYTE ESTERASE URINE: NEGATIVE
LYMPHOCYTES # BLD AUTO: 1.91 K/UL
LYMPHOCYTES NFR BLD AUTO: 25 %
MAN DIFF?: NORMAL
MCHC RBC-ENTMCNC: 31.1 PG
MCHC RBC-ENTMCNC: 33.9 GM/DL
MCV RBC AUTO: 91.7 FL
MICROALBUMIN 24H UR DL<=1MG/L-MCNC: <1.2 MG/DL
MICROALBUMIN/CREAT 24H UR-RTO: NORMAL
MICROSCOPIC-UA: NORMAL
MONOCYTES # BLD AUTO: 0.5 K/UL
MONOCYTES NFR BLD AUTO: 6.6 %
NEUTROPHILS # BLD AUTO: 5.07 K/UL
NEUTROPHILS NFR BLD AUTO: 66.4 %
NITRITE URINE: NEGATIVE
PH URINE: 6.5
PLATELET # BLD AUTO: 249 K/UL
POTASSIUM SERPL-SCNC: 5.1 MMOL/L
PROT SERPL-MCNC: 6.9 G/DL
PROTEIN URINE: NEGATIVE MG/DL
RBC # BLD: 4.95 M/UL
RBC # FLD: 12.4 %
RED BLOOD CELLS URINE: 1 /HPF
SODIUM SERPL-SCNC: 138 MMOL/L
SPECIFIC GRAVITY URINE: 1.02
SQUAMOUS EPITHELIAL CELLS: 0 /HPF
T4 SERPL-MCNC: 7.4 UG/DL
TRIGL SERPL-MCNC: 99 MG/DL
TSH SERPL-ACNC: 3.1 UIU/ML
URATE SERPL-MCNC: 4.1 MG/DL
URINE COMMENTS: NORMAL
UROBILINOGEN URINE: NEGATIVE MG/DL
WBC # FLD AUTO: 7.63 K/UL
WHITE BLOOD CELLS URINE: 1 /HPF

## 2018-12-21 ENCOUNTER — RX RENEWAL (OUTPATIENT)
Age: 63
End: 2018-12-21

## 2018-12-27 LAB — HEMOCCULT STL QL IA: NEGATIVE

## 2019-01-06 ENCOUNTER — RX RENEWAL (OUTPATIENT)
Age: 64
End: 2019-01-06

## 2019-03-14 ENCOUNTER — RX RENEWAL (OUTPATIENT)
Age: 64
End: 2019-03-14

## 2019-03-27 ENCOUNTER — APPOINTMENT (OUTPATIENT)
Dept: CARDIOLOGY | Facility: CLINIC | Age: 64
End: 2019-03-27
Payer: COMMERCIAL

## 2019-03-27 ENCOUNTER — NON-APPOINTMENT (OUTPATIENT)
Age: 64
End: 2019-03-27

## 2019-03-27 VITALS
HEART RATE: 53 BPM | WEIGHT: 201 LBS | HEIGHT: 73 IN | OXYGEN SATURATION: 97 % | SYSTOLIC BLOOD PRESSURE: 136 MMHG | DIASTOLIC BLOOD PRESSURE: 75 MMHG | BODY MASS INDEX: 26.64 KG/M2

## 2019-03-27 PROCEDURE — 93000 ELECTROCARDIOGRAM COMPLETE: CPT

## 2019-03-27 PROCEDURE — 99214 OFFICE O/P EST MOD 30 MIN: CPT | Mod: 25

## 2019-03-27 RX ORDER — NITROGLYCERIN 0.4 MG/1
0.4 TABLET SUBLINGUAL
Qty: 60 | Refills: 0 | Status: DISCONTINUED | COMMUNITY
Start: 2017-09-27 | End: 2019-03-27

## 2019-03-27 NOTE — DISCUSSION/SUMMARY
[___ Month(s)] : [unfilled] month(s) [With Me] : with me [FreeTextEntry1] : \par Coronary artery disease s/p CABG (9/2017):  Stable / doing well; no recurrence of angina at an excellent exercise capacity; continue medical therapy with aspirin, atorvastatin, metoprolol.\par \par Hyperlipidemia: Tolerating atorvastatin with recent LDL less than 50.\par \par Hypertension: Blood pressure remains controlled.

## 2019-03-27 NOTE — HISTORY OF PRESENT ILLNESS
[FreeTextEntry1] : Robert Braga is a 63 year old man with a history of diabetes mellitus, dyslipidemia, hypertension, chronic right bundle branch block, and severe multivessel coronary artery disease s/p CABG x4 (September 29, 2017) who returns for routine cardiac examination.  He continues to feel well and offers no complaints.  He continues to exercise regularly - recently resumed bicycling outdoors.  There has been no recurrence of angina; tolerating medical therapy.\par

## 2019-03-27 NOTE — REASON FOR VISIT
[Follow-Up - Clinic] : a clinic follow-up of [Chest Pain] : chest pain [Coronary Artery Disease] : coronary artery disease [CABG Follow-up] : bypass graft [Hyperlipidemia] : hyperlipidemia

## 2019-03-27 NOTE — REVIEW OF SYSTEMS
[see HPI] : see HPI [Negative] : Heme/Lymph [Feeling Fatigued] : not feeling fatigued [Shortness Of Breath] : no shortness of breath [Dyspnea on exertion] : not dyspnea during exertion [Chest  Pressure] : no chest pressure [Chest Pain] : no chest pain [Palpitations] : no palpitations

## 2019-03-27 NOTE — PHYSICAL EXAM
[Normal Appearance] : normal appearance [Well Groomed] : well groomed [General Appearance - In No Acute Distress] : no acute distress [Respiration, Rhythm And Depth] : normal respiratory rhythm and effort [Auscultation Breath Sounds / Voice Sounds] : lungs were clear to auscultation bilaterally [Heart Rate And Rhythm] : heart rate and rhythm were normal [Heart Sounds] : normal S1 and S2 [Murmurs] : no murmurs present [Edema] : no peripheral edema present [Abnormal Walk] : normal gait [] : no rash [Oriented To Time, Place, And Person] : oriented to person, place, and time [Impaired Insight] : insight and judgment were intact [Affect] : the affect was normal [Mood] : the mood was normal [FreeTextEntry1] : Overweight

## 2019-04-05 ENCOUNTER — APPOINTMENT (OUTPATIENT)
Dept: CARDIOLOGY | Facility: CLINIC | Age: 64
End: 2019-04-05

## 2019-07-25 ENCOUNTER — RX RENEWAL (OUTPATIENT)
Age: 64
End: 2019-07-25

## 2019-07-28 ENCOUNTER — RX RENEWAL (OUTPATIENT)
Age: 64
End: 2019-07-28

## 2019-09-25 ENCOUNTER — APPOINTMENT (OUTPATIENT)
Dept: CARDIOLOGY | Facility: CLINIC | Age: 64
End: 2019-09-25
Payer: COMMERCIAL

## 2019-09-25 ENCOUNTER — NON-APPOINTMENT (OUTPATIENT)
Age: 64
End: 2019-09-25

## 2019-09-25 VITALS
WEIGHT: 201 LBS | OXYGEN SATURATION: 96 % | DIASTOLIC BLOOD PRESSURE: 83 MMHG | SYSTOLIC BLOOD PRESSURE: 163 MMHG | BODY MASS INDEX: 26.64 KG/M2 | HEIGHT: 73 IN | HEART RATE: 57 BPM

## 2019-09-25 VITALS — DIASTOLIC BLOOD PRESSURE: 73 MMHG | SYSTOLIC BLOOD PRESSURE: 144 MMHG

## 2019-09-25 PROCEDURE — 93000 ELECTROCARDIOGRAM COMPLETE: CPT

## 2019-09-25 PROCEDURE — 99214 OFFICE O/P EST MOD 30 MIN: CPT | Mod: 25

## 2019-09-25 NOTE — PHYSICAL EXAM
[Normal Appearance] : normal appearance [General Appearance - In No Acute Distress] : no acute distress [Respiration, Rhythm And Depth] : normal respiratory rhythm and effort [Auscultation Breath Sounds / Voice Sounds] : lungs were clear to auscultation bilaterally [Murmurs] : no murmurs present [Heart Sounds] : normal S1 and S2 [Edema] : no peripheral edema present [] : no rash [Abnormal Walk] : normal gait [Impaired Insight] : insight and judgment were intact [Oriented To Time, Place, And Person] : oriented to person, place, and time [Affect] : the affect was normal [Mood] : the mood was normal [No Oral Cyanosis] : no oral cyanosis [Eyelids - No Xanthelasma] : the eyelids demonstrated no xanthelasmas [FreeTextEntry1] : Bradycardic

## 2019-09-25 NOTE — REVIEW OF SYSTEMS
[Negative] : Heme/Lymph [Feeling Fatigued] : not feeling fatigued [Shortness Of Breath] : no shortness of breath [Dyspnea on exertion] : not dyspnea during exertion [Chest  Pressure] : no chest pressure [Chest Pain] : no chest pain [Palpitations] : no palpitations

## 2019-09-25 NOTE — DISCUSSION/SUMMARY
[___ Month(s)] : [unfilled] month(s) [With Me] : with me [FreeTextEntry1] : \par Coronary artery disease s/p CABG (9/2017):  Stable; continue aspirin, atorvastatin, metoprolol.\par \par Hyperlipidemia: Tolerating atorvastatin -- achieved LDL goal.\par \par Hypertension: Satisfactory control.

## 2019-09-25 NOTE — HISTORY OF PRESENT ILLNESS
[FreeTextEntry1] : Robert Braga is a 63 year old man with a history of diabetes mellitus, dyslipidemia, hypertension, chronic right bundle branch block, and severe multivessel coronary artery disease s/p CABG x4 (September 29, 2017) who returns for routine cardiac examination.  He describes intense physical activity -- regular exercise in the gym and long-distance cycling (as long as 35 miles).  He feels great -- no angina or dyspnea; tolerating medical therapy.\par

## 2019-10-21 ENCOUNTER — APPOINTMENT (OUTPATIENT)
Dept: FAMILY MEDICINE | Facility: CLINIC | Age: 64
End: 2019-10-21
Payer: COMMERCIAL

## 2019-10-21 VITALS
OXYGEN SATURATION: 96 % | DIASTOLIC BLOOD PRESSURE: 72 MMHG | RESPIRATION RATE: 16 BRPM | SYSTOLIC BLOOD PRESSURE: 130 MMHG | HEIGHT: 73 IN | WEIGHT: 205 LBS | BODY MASS INDEX: 27.17 KG/M2 | HEART RATE: 67 BPM

## 2019-10-21 VITALS — HEART RATE: 72 BPM | SYSTOLIC BLOOD PRESSURE: 140 MMHG | RESPIRATION RATE: 16 BRPM | DIASTOLIC BLOOD PRESSURE: 70 MMHG

## 2019-10-21 PROCEDURE — 99214 OFFICE O/P EST MOD 30 MIN: CPT | Mod: 25

## 2019-10-21 PROCEDURE — 90471 IMMUNIZATION ADMIN: CPT

## 2019-10-21 PROCEDURE — 90472 IMMUNIZATION ADMIN EACH ADD: CPT

## 2019-10-21 PROCEDURE — 90670 PCV13 VACCINE IM: CPT

## 2019-10-21 PROCEDURE — 90653 IIV ADJUVANT VACCINE IM: CPT

## 2019-10-21 NOTE — PHYSICAL EXAM
[PERRL] : pupils equal round and reactive to light [No Acute Distress] : no acute distress [Clear to Auscultation] : lungs were clear to auscultation bilaterally [Normal Oropharynx] : the oropharynx was normal [Regular Rhythm] : with a regular rhythm [No Murmur] : no murmur heard [Normal] : soft, non-tender, non-distended, no masses palpated, no HSM and normal bowel sounds [No Joint Swelling] : no joint swelling [No Rash] : no rash [No Focal Deficits] : no focal deficits [Normal Insight/Judgement] : insight and judgment were intact

## 2019-10-21 NOTE — HISTORY OF PRESENT ILLNESS
[Coronary Artery Disease] : Coronary Artery Disease [Diabetes Mellitus] : Diabetes Mellitus [Hyperlipidemia] : Hyperlipidemia [Hypertension] : Hypertension [Check glucose ___ x/day] : Patient checks  blood glucose [unfilled]  times a day [Fasting:  ___] : Fasting Blood Sugar: [unfilled] mg/dL [Most Recent A1C: ___] : Most recent A1C was [unfilled] [Target goal met] : A1C target goal met [Checks BP Regularly] : The patient checks ~his/her~ blood pressure regularly [<130/90] : Target blood pressure is  <130/90 [Doing Well] : Patient is doing well [Managed with medications] : managed with  medication [High Intensity therapy] : Patient is currently on high intensity statin therapy [Well Controlled] : Overall status has been well controlled [FreeTextEntry6] : feeling well here  for  cpe  pt increased  losartan to  100  mg  daily  [de-identified] : ave  glucose <150  [de-identified] : has  been  running  high  systolic  in am 150+ [Symptoms Limit Activities] : Symptoms do not limit ~his/her~ activities [FreeTextEntry1] : exercise  regularly

## 2019-10-21 NOTE — REVIEW OF SYSTEMS
[Impotence] : impotence [Fever] : no fever [Sore Throat] : no sore throat [Chest Pain] : no chest pain [Palpitations] : no palpitations [Shortness Of Breath] : no shortness of breath [Abdominal Pain] : no abdominal pain [Nausea] : no nausea [Dysuria] : no dysuria [Joint Pain] : no joint pain [Itching] : no itching [Headache] : no headache [Memory Loss] : no memory loss [Dizziness] : no dizziness [Suicidal] : not suicidal [Easy Bleeding] : no easy bleeding

## 2019-10-22 ENCOUNTER — TRANSCRIPTION ENCOUNTER (OUTPATIENT)
Age: 64
End: 2019-10-22

## 2019-10-22 LAB
ALBUMIN SERPL ELPH-MCNC: 4.5 G/DL
ALP BLD-CCNC: 73 U/L
ALT SERPL-CCNC: 21 U/L
ANION GAP SERPL CALC-SCNC: 12 MMOL/L
APPEARANCE: CLEAR
AST SERPL-CCNC: 22 U/L
BACTERIA: NEGATIVE
BASOPHILS # BLD AUTO: 0.03 K/UL
BASOPHILS NFR BLD AUTO: 0.5 %
BILIRUB SERPL-MCNC: 0.9 MG/DL
BILIRUBIN URINE: NEGATIVE
BLOOD URINE: NEGATIVE
BUN SERPL-MCNC: 7 MG/DL
CALCIUM SERPL-MCNC: 9.3 MG/DL
CHLORIDE SERPL-SCNC: 103 MMOL/L
CHOLEST SERPL-MCNC: 77 MG/DL
CHOLEST/HDLC SERPL: 2.5 RATIO
CO2 SERPL-SCNC: 24 MMOL/L
COLOR: YELLOW
CREAT SERPL-MCNC: 0.74 MG/DL
CREAT SPEC-SCNC: 151 MG/DL
EOSINOPHIL # BLD AUTO: 0.11 K/UL
EOSINOPHIL NFR BLD AUTO: 1.9 %
ESTIMATED AVERAGE GLUCOSE: 134 MG/DL
GLUCOSE QUALITATIVE U: NEGATIVE
GLUCOSE SERPL-MCNC: 88 MG/DL
HBA1C MFR BLD HPLC: 6.3 %
HCT VFR BLD CALC: 44.1 %
HDLC SERPL-MCNC: 31 MG/DL
HGB BLD-MCNC: 15.1 G/DL
HYALINE CASTS: 0 /LPF
IMM GRANULOCYTES NFR BLD AUTO: 0.2 %
KETONES URINE: NEGATIVE
LDLC SERPL CALC-MCNC: 28 MG/DL
LEUKOCYTE ESTERASE URINE: NEGATIVE
LYMPHOCYTES # BLD AUTO: 1.83 K/UL
LYMPHOCYTES NFR BLD AUTO: 30.9 %
MAN DIFF?: NORMAL
MCHC RBC-ENTMCNC: 31.5 PG
MCHC RBC-ENTMCNC: 34.2 GM/DL
MCV RBC AUTO: 92.1 FL
MICROALBUMIN 24H UR DL<=1MG/L-MCNC: <1.2 MG/DL
MICROALBUMIN/CREAT 24H UR-RTO: NORMAL MG/G
MICROSCOPIC-UA: NORMAL
MONOCYTES # BLD AUTO: 0.47 K/UL
MONOCYTES NFR BLD AUTO: 7.9 %
NEUTROPHILS # BLD AUTO: 3.47 K/UL
NEUTROPHILS NFR BLD AUTO: 58.6 %
NITRITE URINE: NEGATIVE
PH URINE: 6
PLATELET # BLD AUTO: 220 K/UL
POTASSIUM SERPL-SCNC: 4.5 MMOL/L
PROT SERPL-MCNC: 6.8 G/DL
PROTEIN URINE: NORMAL
RBC # BLD: 4.79 M/UL
RBC # FLD: 11.7 %
RED BLOOD CELLS URINE: 2 /HPF
SODIUM SERPL-SCNC: 139 MMOL/L
SPECIFIC GRAVITY URINE: 1.02
SQUAMOUS EPITHELIAL CELLS: 0 /HPF
T4 SERPL-MCNC: 6.5 UG/DL
TRIGL SERPL-MCNC: 92 MG/DL
TSH SERPL-ACNC: 3.42 UIU/ML
URATE SERPL-MCNC: 4.3 MG/DL
UROBILINOGEN URINE: NORMAL
WBC # FLD AUTO: 5.92 K/UL
WHITE BLOOD CELLS URINE: 1 /HPF

## 2019-11-01 ENCOUNTER — TRANSCRIPTION ENCOUNTER (OUTPATIENT)
Age: 64
End: 2019-11-01

## 2019-11-05 ENCOUNTER — RX RENEWAL (OUTPATIENT)
Age: 64
End: 2019-11-05

## 2019-11-07 ENCOUNTER — RX RENEWAL (OUTPATIENT)
Age: 64
End: 2019-11-07

## 2019-12-02 ENCOUNTER — RX RENEWAL (OUTPATIENT)
Age: 64
End: 2019-12-02

## 2019-12-17 ENCOUNTER — APPOINTMENT (OUTPATIENT)
Dept: FAMILY MEDICINE | Facility: CLINIC | Age: 64
End: 2019-12-17
Payer: COMMERCIAL

## 2019-12-17 VITALS
WEIGHT: 206.5 LBS | BODY MASS INDEX: 27.37 KG/M2 | HEIGHT: 73 IN | OXYGEN SATURATION: 98 % | HEART RATE: 67 BPM | SYSTOLIC BLOOD PRESSURE: 140 MMHG | DIASTOLIC BLOOD PRESSURE: 82 MMHG

## 2019-12-17 VITALS — HEART RATE: 72 BPM | RESPIRATION RATE: 16 BRPM | SYSTOLIC BLOOD PRESSURE: 140 MMHG | DIASTOLIC BLOOD PRESSURE: 72 MMHG

## 2019-12-17 PROCEDURE — 99396 PREV VISIT EST AGE 40-64: CPT

## 2019-12-17 RX ORDER — EFINACONAZOLE 100 MG/ML
10 SOLUTION TOPICAL
Qty: 4 | Refills: 0 | Status: COMPLETED | COMMUNITY
Start: 2016-10-15 | End: 2019-12-17

## 2019-12-17 NOTE — REVIEW OF SYSTEMS
[Constipation] : constipation [Insomnia] : insomnia [Fatigue] : no fatigue [Chest Pain] : no chest pain [Hearing Loss] : no hearing loss [Vision Problems] : no vision problems [Shortness Of Breath] : no shortness of breath [Palpitations] : no palpitations [Dysuria] : no dysuria [Diarrhea] : no diarrhea [Dyspnea on Exertion] : not dyspnea on exertion [Mole Changes] : no mole changes [Joint Pain] : no joint pain [Headache] : no headache [Swollen Glands] : no swollen glands

## 2019-12-17 NOTE — PHYSICAL EXAM
[No Acute Distress] : no acute distress [Normal Oropharynx] : the oropharynx was normal [PERRL] : pupils equal round and reactive to light [Supple] : supple [Clear to Auscultation] : lungs were clear to auscultation bilaterally [Normal S1, S2] : normal S1 and S2 [No Edema] : there was no peripheral edema [No Mass] : no mass [Urethral Meatus] : meatus normal [Urinary Bladder Findings] : the bladder was normal on palpation [Scrotum] : the scrotum was normal [Testes Mass (___cm)] : there were no testicular masses [No Prostate Nodules] : no prostate nodules [Normal Supraclavicular Nodes] : no supraclavicular lymphadenopathy [Normal Posterior Cervical Nodes] : no posterior cervical lymphadenopathy [Normal Anterior Cervical Nodes] : no anterior cervical lymphadenopathy [Normal] : no rash [Normal Insight/Judgement] : insight and judgment were intact [No Focal Deficits] : no focal deficits [de-identified] : eac  cerumenous

## 2019-12-17 NOTE — HISTORY OF PRESENT ILLNESS
[FreeTextEntry1] : pt  here  for  cpe and management of htn hld dm  [de-identified] : bp  running  high in am 140-`150 inspite of increase in losartan to 100   mg

## 2019-12-17 NOTE — HEALTH RISK ASSESSMENT
[Yes] : Yes [Very Good] : ~his/her~  mood as very good [1 or 2 (0 pts)] : 1 or 2 (0 points) [2 - 4 times a month (2 pts)] : 2-4 times a month (2 points) [0] : 2) Feeling down, depressed, or hopeless: Not at all (0) [Patient reported colonoscopy was normal] : Patient reported colonoscopy was normal [Alone] : lives alone [Employed] : employed [# Of Children ___] : has [unfilled] children [] :  [Graduate School] : graduate school [Sexually Active] : sexually active [Smoke Detector] : smoke detector [Carbon Monoxide Detector] : carbon monoxide detector [Seat Belt] :  uses seat belt [With Patient/Caregiver] : With Patient/Caregiver [Relationship: ___] : Relationship: [unfilled] [] : No [de-identified] : regular  [[id number]] : 0  [Reports changes in hearing] : Reports no changes in hearing [Reports changes in vision] : Reports no changes in vision [Reports changes in dental health] : Reports no changes in dental health [ColonoscopyDate] : 6/14 [de-identified] :   [AdvancecareDate] : 12/19

## 2020-03-12 ENCOUNTER — RX RENEWAL (OUTPATIENT)
Age: 65
End: 2020-03-12

## 2020-04-08 ENCOUNTER — APPOINTMENT (OUTPATIENT)
Dept: CARDIOLOGY | Facility: CLINIC | Age: 65
End: 2020-04-08

## 2020-05-24 ENCOUNTER — RX RENEWAL (OUTPATIENT)
Age: 65
End: 2020-05-24

## 2020-05-28 ENCOUNTER — RX RENEWAL (OUTPATIENT)
Age: 65
End: 2020-05-28

## 2020-08-27 ENCOUNTER — APPOINTMENT (OUTPATIENT)
Dept: FAMILY MEDICINE | Facility: CLINIC | Age: 65
End: 2020-08-27
Payer: COMMERCIAL

## 2020-08-27 VITALS — RESPIRATION RATE: 16 BRPM | SYSTOLIC BLOOD PRESSURE: 132 MMHG | HEART RATE: 72 BPM | DIASTOLIC BLOOD PRESSURE: 80 MMHG

## 2020-08-27 VITALS
HEART RATE: 72 BPM | WEIGHT: 216 LBS | BODY MASS INDEX: 28.63 KG/M2 | DIASTOLIC BLOOD PRESSURE: 78 MMHG | OXYGEN SATURATION: 96 % | HEIGHT: 73 IN | SYSTOLIC BLOOD PRESSURE: 140 MMHG

## 2020-08-27 PROCEDURE — 99214 OFFICE O/P EST MOD 30 MIN: CPT

## 2020-08-27 RX ORDER — INSULIN ASPART 100 [IU]/ML
100 INJECTION, SOLUTION INTRAVENOUS; SUBCUTANEOUS
Refills: 0 | Status: COMPLETED | COMMUNITY
End: 2020-08-27

## 2020-08-27 NOTE — HISTORY OF PRESENT ILLNESS
[Diabetes Mellitus] : Diabetes Mellitus [Hyperlipidemia] : Hyperlipidemia [Hypertension] : Hypertension [Other: ___] : [unfilled] [Most Recent A1C: ___] : Most recent A1C was [unfilled] [Check glucose ___ x/day] : Patient checks  blood glucose [unfilled]  times a day [Checks BP Sporadically] : The patient checks ~his/her~ blood pressure sporadically [<140/90] : Target blood pressure is <140/90 [Target goal met] : BP target goal met [Managed with medications] : managed with  medication [Doing Well] : Patient is doing well [High Intensity therapy] : Patient is currently on high intensity statin therapy [Well Controlled] : Overall status has been well controlled [de-identified] : ave 140  [FreeTextEntry6] : feeling well  [Symptoms Limit Activities] : Symptoms do not limit ~his/her~ activities

## 2020-08-27 NOTE — REVIEW OF SYSTEMS
[Chest Pain] : no chest pain [Shortness Of Breath] : no shortness of breath [Abdominal Pain] : no abdominal pain [Constipation] : no constipation [Nocturia] : no nocturia

## 2020-08-27 NOTE — PHYSICAL EXAM
[No Acute Distress] : no acute distress [PERRL] : pupils equal round and reactive to light [Normal Oropharynx] : the oropharynx was normal [Supple] : supple [Normal S1, S2] : normal S1 and S2 [Clear to Auscultation] : lungs were clear to auscultation bilaterally [No Edema] : there was no peripheral edema [Normal Supraclavicular Nodes] : no supraclavicular lymphadenopathy [Normal Posterior Cervical Nodes] : no posterior cervical lymphadenopathy [Normal Anterior Cervical Nodes] : no anterior cervical lymphadenopathy [Normal] : no joint swelling and grossly normal strength and tone [No Focal Deficits] : no focal deficits [Normal Insight/Judgement] : insight and judgment were intact

## 2020-08-28 ENCOUNTER — TRANSCRIPTION ENCOUNTER (OUTPATIENT)
Age: 65
End: 2020-08-28

## 2020-08-28 LAB
ALBUMIN SERPL ELPH-MCNC: 4.6 G/DL
ALP BLD-CCNC: 68 U/L
ALT SERPL-CCNC: 23 U/L
ANION GAP SERPL CALC-SCNC: 13 MMOL/L
APPEARANCE: CLEAR
AST SERPL-CCNC: 23 U/L
BACTERIA: NEGATIVE
BASOPHILS # BLD AUTO: 0.05 K/UL
BASOPHILS NFR BLD AUTO: 0.6 %
BILIRUB SERPL-MCNC: 0.8 MG/DL
BILIRUBIN URINE: NEGATIVE
BLOOD URINE: NEGATIVE
BUN SERPL-MCNC: 15 MG/DL
CALCIUM SERPL-MCNC: 9.8 MG/DL
CHLORIDE SERPL-SCNC: 100 MMOL/L
CHOLEST SERPL-MCNC: 98 MG/DL
CHOLEST/HDLC SERPL: 2.9 RATIO
CO2 SERPL-SCNC: 25 MMOL/L
COLOR: NORMAL
CREAT SERPL-MCNC: 0.9 MG/DL
CREAT SPEC-SCNC: 74 MG/DL
EOSINOPHIL # BLD AUTO: 0.1 K/UL
EOSINOPHIL NFR BLD AUTO: 1.2 %
ESTIMATED AVERAGE GLUCOSE: 148 MG/DL
GLUCOSE QUALITATIVE U: NEGATIVE
GLUCOSE SERPL-MCNC: 141 MG/DL
HBA1C MFR BLD HPLC: 6.8 %
HCT VFR BLD CALC: 44.4 %
HDLC SERPL-MCNC: 34 MG/DL
HGB BLD-MCNC: 14.6 G/DL
HYALINE CASTS: 0 /LPF
IMM GRANULOCYTES NFR BLD AUTO: 0.4 %
KETONES URINE: NEGATIVE
LDLC SERPL CALC-MCNC: 40 MG/DL
LEUKOCYTE ESTERASE URINE: NEGATIVE
LYMPHOCYTES # BLD AUTO: 2.04 K/UL
LYMPHOCYTES NFR BLD AUTO: 24.9 %
MAN DIFF?: NORMAL
MCHC RBC-ENTMCNC: 31.5 PG
MCHC RBC-ENTMCNC: 32.9 GM/DL
MCV RBC AUTO: 95.9 FL
MICROALBUMIN 24H UR DL<=1MG/L-MCNC: <1.2 MG/DL
MICROALBUMIN/CREAT 24H UR-RTO: NORMAL MG/G
MICROSCOPIC-UA: NORMAL
MONOCYTES # BLD AUTO: 0.63 K/UL
MONOCYTES NFR BLD AUTO: 7.7 %
NEUTROPHILS # BLD AUTO: 5.35 K/UL
NEUTROPHILS NFR BLD AUTO: 65.2 %
NITRITE URINE: NEGATIVE
PH URINE: 6.5
PLATELET # BLD AUTO: 253 K/UL
POTASSIUM SERPL-SCNC: 4.9 MMOL/L
PROT SERPL-MCNC: 6.8 G/DL
PROTEIN URINE: NEGATIVE
RBC # BLD: 4.63 M/UL
RBC # FLD: 11.9 %
RED BLOOD CELLS URINE: 1 /HPF
SODIUM SERPL-SCNC: 138 MMOL/L
SPECIFIC GRAVITY URINE: 1.01
SQUAMOUS EPITHELIAL CELLS: 0 /HPF
T4 SERPL-MCNC: 6 UG/DL
TRIGL SERPL-MCNC: 116 MG/DL
TSH SERPL-ACNC: 2.57 UIU/ML
URATE SERPL-MCNC: 4.1 MG/DL
UROBILINOGEN URINE: NORMAL
WBC # FLD AUTO: 8.2 K/UL
WHITE BLOOD CELLS URINE: 0 /HPF

## 2020-09-18 ENCOUNTER — RX RENEWAL (OUTPATIENT)
Age: 65
End: 2020-09-18

## 2020-10-17 ENCOUNTER — RX RENEWAL (OUTPATIENT)
Age: 65
End: 2020-10-17

## 2020-11-01 ENCOUNTER — NON-APPOINTMENT (OUTPATIENT)
Age: 65
End: 2020-11-01

## 2020-11-04 ENCOUNTER — APPOINTMENT (OUTPATIENT)
Dept: CARDIOLOGY | Facility: CLINIC | Age: 65
End: 2020-11-04
Payer: COMMERCIAL

## 2020-11-04 ENCOUNTER — NON-APPOINTMENT (OUTPATIENT)
Age: 65
End: 2020-11-04

## 2020-11-04 VITALS
HEIGHT: 73 IN | OXYGEN SATURATION: 95 % | WEIGHT: 214 LBS | SYSTOLIC BLOOD PRESSURE: 133 MMHG | HEART RATE: 77 BPM | BODY MASS INDEX: 28.36 KG/M2 | DIASTOLIC BLOOD PRESSURE: 77 MMHG

## 2020-11-04 PROCEDURE — 99214 OFFICE O/P EST MOD 30 MIN: CPT

## 2020-11-04 PROCEDURE — 93000 ELECTROCARDIOGRAM COMPLETE: CPT

## 2020-11-04 PROCEDURE — 99072 ADDL SUPL MATRL&STAF TM PHE: CPT

## 2020-11-04 RX ORDER — LOSARTAN POTASSIUM AND HYDROCHLOROTHIAZIDE 12.5; 1 MG/1; MG/1
100-12.5 TABLET ORAL DAILY
Qty: 90 | Refills: 1 | Status: COMPLETED | COMMUNITY
Start: 2019-12-17 | End: 2020-11-04

## 2020-11-04 NOTE — PHYSICAL EXAM
[Respiration, Rhythm And Depth] : normal respiratory rhythm and effort [Auscultation Breath Sounds / Voice Sounds] : lungs were clear to auscultation bilaterally [Heart Sounds] : normal S1 and S2 [Murmurs] : no murmurs present [Edema] : no peripheral edema present [Abnormal Walk] : normal gait [] : no rash [Oriented To Time, Place, And Person] : oriented to person, place, and time [Impaired Insight] : insight and judgment were intact [Affect] : the affect was normal [Mood] : the mood was normal [Heart Rate And Rhythm] : heart rate and rhythm were normal [Bowel Sounds] : normal bowel sounds [FreeTextEntry1] : Wearing a face mask

## 2020-11-04 NOTE — DISCUSSION/SUMMARY
[___ Month(s)] : [unfilled] month(s) [With Me] : with me [FreeTextEntry1] : \par Coronary artery disease: Patient continues to do well status post CABG approximately 3 years ago and has not had a recurrence of angina; continue aspirin.\par \par Hypertension: Patient has been taking an additional dose of losartan (25 mg daily at bedtime) an effort to optimize blood pressure -- I asked him to decrease to the maximum recommended dose of 100 mg daily; if blood pressure is not well controlled on this regimen I would consider adding amlodipine (he said he did not tolerate HCTZ).\par \par Hyperlipidemia: Patient has achieved his LDL goal.\par \par Diabetes mellitus Mild increase in average blood glucose but overall control is good.

## 2020-11-04 NOTE — REVIEW OF SYSTEMS
[Negative] : Heme/Lymph [Recent Weight Gain (___ Lbs)] : recent [unfilled] ~Ulb weight gain [Shortness Of Breath] : no shortness of breath [Dyspnea on exertion] : not dyspnea during exertion [Chest  Pressure] : no chest pressure [Chest Pain] : no chest pain [Lower Ext Edema] : no extremity edema [Palpitations] : no palpitations

## 2020-11-04 NOTE — HISTORY OF PRESENT ILLNESS
[FreeTextEntry1] : Robert Braga is a 65 year old man with a history of diabetes mellitus, dyslipidemia, hypertension, chronic right bundle branch block, and severe multivessel coronary artery disease s/p CABG x4 (September 29, 2017) who returns for cardiac examination - our last encounter was approximately 13 months ago. Recent labs revealed an LDL of 40, HDL 34, triglycerides 116; glycemic control is good with a hemoglobin A1c 6.8%.  He feels well - continues to bicycle long distances (11 mile ride earlier this day). He has not been experiencing angina or dyspnea.\par

## 2020-11-08 ENCOUNTER — RX RENEWAL (OUTPATIENT)
Age: 65
End: 2020-11-08

## 2020-11-28 ENCOUNTER — RX RENEWAL (OUTPATIENT)
Age: 65
End: 2020-11-28

## 2020-12-02 NOTE — PHYSICAL THERAPY INITIAL EVALUATION ADULT - PHYSICAL ASSIST/NONPHYSICAL ASSIST: SIT/STAND, REHAB EVAL
-- DO NOT REPLY / DO NOT REPLY ALL --  -- Message is from the [facility]--      Patient is requesting a medication refill - medication is on active list    Was Medication Pended? No.     Rx Name and Dose:    propranolol (INDERAL) 80 MG tablet    Duration: 90 days    Pharmacy  [id number] [place] [address] At [facility]    Patient confirmed the above pharmacy as correct?  Yes    Caller Information       Type Contact Phone    12/02/2020 05:06 PM CST Phone (Incoming) [facility] STORE #[id number] [place] [address] AT [facility] (Pharmacy) [phone]          Alternative phone number: NA    Turnaround time given to caller:   \"This message will be sent to [state Provider's name]. The clinical team will fulfill your request as soon as they review your message when the office opens tomorrow.\"  
verbal cues/supervision

## 2020-12-22 ENCOUNTER — APPOINTMENT (OUTPATIENT)
Dept: FAMILY MEDICINE | Facility: CLINIC | Age: 65
End: 2020-12-22
Payer: COMMERCIAL

## 2020-12-22 VITALS
WEIGHT: 213 LBS | HEART RATE: 66 BPM | SYSTOLIC BLOOD PRESSURE: 134 MMHG | DIASTOLIC BLOOD PRESSURE: 72 MMHG | BODY MASS INDEX: 28.23 KG/M2 | TEMPERATURE: 97 F | HEIGHT: 73 IN | OXYGEN SATURATION: 98 %

## 2020-12-22 VITALS — DIASTOLIC BLOOD PRESSURE: 74 MMHG | SYSTOLIC BLOOD PRESSURE: 120 MMHG | RESPIRATION RATE: 16 BRPM | HEART RATE: 84 BPM

## 2020-12-22 PROCEDURE — 99396 PREV VISIT EST AGE 40-64: CPT | Mod: 25

## 2020-12-22 PROCEDURE — 36415 COLL VENOUS BLD VENIPUNCTURE: CPT

## 2020-12-22 PROCEDURE — 99072 ADDL SUPL MATRL&STAF TM PHE: CPT

## 2020-12-22 NOTE — HISTORY OF PRESENT ILLNESS
[Diabetes Mellitus] : Diabetes Mellitus [Hyperlipidemia] : Hyperlipidemia [Hypertension] : Hypertension [Other: ___] : [unfilled] [Check glucose ___ x/day] : Patient checks  blood glucose [unfilled]  times a day [Most Recent A1C: ___] : Most recent A1C was [unfilled] [Checks BP Sporadically] : The patient checks ~his/her~ blood pressure sporadically [<140/90] : Target blood pressure is <140/90 [Target goal met] : BP target goal met [Doing Well] : Patient is doing well [Managed with medications] : managed with  medication [High Intensity therapy] : Patient is currently on high intensity statin therapy [Well Controlled] : Overall status has been well controlled [FreeTextEntry6] : feeling well stopped hctz  caused  nocturia  [de-identified] : ave 140  [Symptoms Limit Activities] : Symptoms do not limit ~his/her~ activities [FreeTextEntry1] : exercising  regular

## 2020-12-22 NOTE — PHYSICAL EXAM
[No Acute Distress] : no acute distress [PERRL] : pupils equal round and reactive to light [Normal Oropharynx] : the oropharynx was normal [Supple] : supple [Clear to Auscultation] : lungs were clear to auscultation bilaterally [Normal S1, S2] : normal S1 and S2 [No Edema] : there was no peripheral edema [Normal Supraclavicular Nodes] : no supraclavicular lymphadenopathy [Normal Posterior Cervical Nodes] : no posterior cervical lymphadenopathy [Normal Anterior Cervical Nodes] : no anterior cervical lymphadenopathy [Normal] : no rash [No Focal Deficits] : no focal deficits [Normal Insight/Judgement] : insight and judgment were intact

## 2020-12-22 NOTE — HEALTH RISK ASSESSMENT
[Yes] : Yes [2 - 4 times a month (2 pts)] : 2-4 times a month (2 points) [0] : 2) Feeling down, depressed, or hopeless: Not at all (0) [] : No [de-identified] : cardiology  [de-identified] : regular

## 2020-12-23 ENCOUNTER — TRANSCRIPTION ENCOUNTER (OUTPATIENT)
Age: 65
End: 2020-12-23

## 2020-12-23 LAB
ALBUMIN SERPL ELPH-MCNC: 4.6 G/DL
ALP BLD-CCNC: 80 U/L
ALT SERPL-CCNC: 25 U/L
ANION GAP SERPL CALC-SCNC: 9 MMOL/L
AST SERPL-CCNC: 27 U/L
BASOPHILS # BLD AUTO: 0.05 K/UL
BASOPHILS NFR BLD AUTO: 0.7 %
BILIRUB SERPL-MCNC: 1 MG/DL
BUN SERPL-MCNC: 9 MG/DL
CALCIUM SERPL-MCNC: 10.1 MG/DL
CHLORIDE SERPL-SCNC: 103 MMOL/L
CHOLEST SERPL-MCNC: 110 MG/DL
CO2 SERPL-SCNC: 29 MMOL/L
CREAT SERPL-MCNC: 0.92 MG/DL
EOSINOPHIL # BLD AUTO: 0.12 K/UL
EOSINOPHIL NFR BLD AUTO: 1.7 %
ESTIMATED AVERAGE GLUCOSE: 151 MG/DL
GLUCOSE SERPL-MCNC: 76 MG/DL
HBA1C MFR BLD HPLC: 6.9 %
HCT VFR BLD CALC: 44.8 %
HDLC SERPL-MCNC: 35 MG/DL
HGB BLD-MCNC: 15.1 G/DL
IMM GRANULOCYTES NFR BLD AUTO: 0.4 %
LDLC SERPL CALC-MCNC: 55 MG/DL
LYMPHOCYTES # BLD AUTO: 1.77 K/UL
LYMPHOCYTES NFR BLD AUTO: 25.6 %
MAN DIFF?: NORMAL
MCHC RBC-ENTMCNC: 31.5 PG
MCHC RBC-ENTMCNC: 33.7 GM/DL
MCV RBC AUTO: 93.3 FL
MONOCYTES # BLD AUTO: 0.56 K/UL
MONOCYTES NFR BLD AUTO: 8.1 %
NEUTROPHILS # BLD AUTO: 4.39 K/UL
NEUTROPHILS NFR BLD AUTO: 63.5 %
NONHDLC SERPL-MCNC: 75 MG/DL
PLATELET # BLD AUTO: 250 K/UL
POTASSIUM SERPL-SCNC: 4.5 MMOL/L
PROT SERPL-MCNC: 6.9 G/DL
RBC # BLD: 4.8 M/UL
RBC # FLD: 11.7 %
SODIUM SERPL-SCNC: 140 MMOL/L
T4 SERPL-MCNC: 6.4 UG/DL
TRIGL SERPL-MCNC: 101 MG/DL
TSH SERPL-ACNC: 3.52 UIU/ML
URATE SERPL-MCNC: 3.7 MG/DL
WBC # FLD AUTO: 6.92 K/UL

## 2020-12-26 ENCOUNTER — RX RENEWAL (OUTPATIENT)
Age: 65
End: 2020-12-26

## 2021-02-08 ENCOUNTER — RX RENEWAL (OUTPATIENT)
Age: 66
End: 2021-02-08

## 2021-02-09 ENCOUNTER — NON-APPOINTMENT (OUTPATIENT)
Age: 66
End: 2021-02-09

## 2021-02-12 ENCOUNTER — RX RENEWAL (OUTPATIENT)
Age: 66
End: 2021-02-12

## 2021-03-20 ENCOUNTER — RX RENEWAL (OUTPATIENT)
Age: 66
End: 2021-03-20

## 2021-08-24 ENCOUNTER — APPOINTMENT (OUTPATIENT)
Dept: FAMILY MEDICINE | Facility: CLINIC | Age: 66
End: 2021-08-24
Payer: COMMERCIAL

## 2021-08-24 VITALS
TEMPERATURE: 98 F | HEART RATE: 75 BPM | DIASTOLIC BLOOD PRESSURE: 72 MMHG | BODY MASS INDEX: 28.1 KG/M2 | SYSTOLIC BLOOD PRESSURE: 124 MMHG | WEIGHT: 212 LBS | HEIGHT: 73 IN | OXYGEN SATURATION: 97 %

## 2021-08-24 DIAGNOSIS — Z00.00 ENCOUNTER FOR GENERAL ADULT MEDICAL EXAMINATION W/OUT ABNORMAL FINDINGS: ICD-10-CM

## 2021-08-24 PROCEDURE — 99397 PER PM REEVAL EST PAT 65+ YR: CPT

## 2021-08-24 RX ORDER — FLASH GLUCOSE SCANNING READER
EACH MISCELLANEOUS
Qty: 1 | Refills: 0 | Status: ACTIVE | COMMUNITY
Start: 2020-12-22 | End: 1900-01-01

## 2021-08-24 NOTE — HISTORY OF PRESENT ILLNESS
[FreeTextEntry1] : annual [de-identified] : annual well active attempting to exercise and eat healthy colonoscopy utd history of cad htnn hyperlopids IDDm controlled on current medications deneis chest pain palpitaoitns or dyspnea sees cardiology does not see endo

## 2021-08-25 LAB
25(OH)D3 SERPL-MCNC: 40 NG/ML
ALBUMIN SERPL ELPH-MCNC: 4.7 G/DL
ALP BLD-CCNC: 89 U/L
ALT SERPL-CCNC: 28 U/L
ANION GAP SERPL CALC-SCNC: 18 MMOL/L
AST SERPL-CCNC: 32 U/L
BASOPHILS # BLD AUTO: 0.06 K/UL
BASOPHILS NFR BLD AUTO: 0.7 %
BILIRUB SERPL-MCNC: 1 MG/DL
BUN SERPL-MCNC: 10 MG/DL
CALCIUM SERPL-MCNC: 9.8 MG/DL
CHLORIDE SERPL-SCNC: 98 MMOL/L
CHOLEST SERPL-MCNC: 128 MG/DL
CO2 SERPL-SCNC: 23 MMOL/L
CREAT SERPL-MCNC: 0.8 MG/DL
EOSINOPHIL # BLD AUTO: 0.1 K/UL
EOSINOPHIL NFR BLD AUTO: 1.2 %
ESTIMATED AVERAGE GLUCOSE: 146 MG/DL
GLUCOSE SERPL-MCNC: 36 MG/DL
HBA1C MFR BLD HPLC: 6.7 %
HCT VFR BLD CALC: 46.4 %
HDLC SERPL-MCNC: 42 MG/DL
HGB BLD-MCNC: 15.5 G/DL
IMM GRANULOCYTES NFR BLD AUTO: 0.5 %
LDLC SERPL CALC-MCNC: 54 MG/DL
LYMPHOCYTES # BLD AUTO: 2.26 K/UL
LYMPHOCYTES NFR BLD AUTO: 27.6 %
MAN DIFF?: NORMAL
MCHC RBC-ENTMCNC: 31.6 PG
MCHC RBC-ENTMCNC: 33.4 GM/DL
MCV RBC AUTO: 94.5 FL
MONOCYTES # BLD AUTO: 0.79 K/UL
MONOCYTES NFR BLD AUTO: 9.7 %
NEUTROPHILS # BLD AUTO: 4.93 K/UL
NEUTROPHILS NFR BLD AUTO: 60.3 %
NONHDLC SERPL-MCNC: 86 MG/DL
PLATELET # BLD AUTO: 265 K/UL
POTASSIUM SERPL-SCNC: 3.9 MMOL/L
PROT SERPL-MCNC: 7.3 G/DL
PSA SERPL-MCNC: 0.87 NG/ML
RBC # BLD: 4.91 M/UL
RBC # FLD: 12 %
SODIUM SERPL-SCNC: 139 MMOL/L
T4 SERPL-MCNC: 6.3 UG/DL
TRIGL SERPL-MCNC: 160 MG/DL
TSH SERPL-ACNC: 3.35 UIU/ML
URATE SERPL-MCNC: 3.8 MG/DL
WBC # FLD AUTO: 8.18 K/UL

## 2021-09-10 NOTE — H&P CARDIOLOGY - RESPIRATORY AND THORAX
----- Message from Skylar Whitt sent at 9/10/2021  1:17 PM CDT -----  Please call pt @ [phone] regarding wheelchair.    
Having staff look and see if information was received via fax from Lisa for wheelchair repairs  
negative

## 2021-10-05 ENCOUNTER — NON-APPOINTMENT (OUTPATIENT)
Age: 66
End: 2021-10-05

## 2021-11-10 ENCOUNTER — NON-APPOINTMENT (OUTPATIENT)
Age: 66
End: 2021-11-10

## 2021-11-10 ENCOUNTER — APPOINTMENT (OUTPATIENT)
Dept: CARDIOLOGY | Facility: CLINIC | Age: 66
End: 2021-11-10
Payer: COMMERCIAL

## 2021-11-10 VITALS
WEIGHT: 206 LBS | DIASTOLIC BLOOD PRESSURE: 72 MMHG | OXYGEN SATURATION: 96 % | HEART RATE: 72 BPM | SYSTOLIC BLOOD PRESSURE: 120 MMHG | BODY MASS INDEX: 27.3 KG/M2 | HEIGHT: 73 IN

## 2021-11-10 PROCEDURE — 99214 OFFICE O/P EST MOD 30 MIN: CPT

## 2021-11-10 PROCEDURE — 93000 ELECTROCARDIOGRAM COMPLETE: CPT

## 2021-11-10 RX ORDER — HYDROCHLOROTHIAZIDE 12.5 MG/1
12.5 TABLET ORAL DAILY
Qty: 90 | Refills: 1 | Status: COMPLETED | COMMUNITY
Start: 2020-04-17 | End: 2021-11-10

## 2021-11-10 NOTE — REVIEW OF SYSTEMS
[Weight Loss (___ Lbs)] : [unfilled] ~Ulb weight loss [SOB] : no shortness of breath [Dyspnea on exertion] : not dyspnea during exertion [Chest Discomfort] : no chest discomfort [Palpitations] : no palpitations

## 2021-11-10 NOTE — DISCUSSION/SUMMARY
[With Me] : with me [___ Year(s)] : in [unfilled] year(s) [FreeTextEntry1] : \par Coronary artery disease: Stable and without ischemic symptoms; continue medical management with aspirin, atorvastatin, and metoprolol.\par \par Hypertension: Blood pressure was well controlled; continue losartan 100 mg daily.\par \par Hyperlipidemia: Controlled; continue atorvastatin.\par \par Abnormal ECG: Right bundle branch block his pre-existing; base ECG is similar to previous tracings\par

## 2021-11-10 NOTE — PHYSICAL EXAM
[Normal Appearance] : normal appearance [Well Groomed] : well groomed [FreeTextEntry1] : Wearing a face mask [Respiration, Rhythm And Depth] : normal respiratory rhythm and effort [Auscultation Breath Sounds / Voice Sounds] : lungs were clear to auscultation bilaterally [Heart Rate And Rhythm] : heart rate and rhythm were normal [Heart Sounds] : normal S1 and S2 [Murmurs] : no murmurs present [Edema] : no peripheral edema present [Abnormal Walk] : normal gait [] : no rash [Oriented To Time, Place, And Person] : oriented to person, place, and time [Impaired Insight] : insight and judgment were intact [Affect] : the affect was normal [Mood] : the mood was normal

## 2021-11-10 NOTE — HISTORY OF PRESENT ILLNESS
[FreeTextEntry1] : Robert Braga is a 66 year old man with a history of diabetes mellitus, dyslipidemia, hypertension, chronic right bundle branch block, and severe multivessel coronary artery disease s/p CABG x4 (September 29, 2017) who returns for cardiac examination - our last encounter was approximately 1 year ago.\par \par Recent laboratory data revealed well-controlled diabetes (hemoglobin A1c 6.7%), and LDL 54\par \par He describes a continued excellent exercise capacity and no exertional symptoms. He is tolerating prescribed for medical therapy.

## 2022-03-10 ENCOUNTER — APPOINTMENT (OUTPATIENT)
Dept: FAMILY MEDICINE | Facility: CLINIC | Age: 67
End: 2022-03-10
Payer: COMMERCIAL

## 2022-03-10 VITALS — DIASTOLIC BLOOD PRESSURE: 80 MMHG | SYSTOLIC BLOOD PRESSURE: 140 MMHG | HEART RATE: 72 BPM | RESPIRATION RATE: 16 BRPM

## 2022-03-10 VITALS
BODY MASS INDEX: 26.9 KG/M2 | HEIGHT: 73 IN | SYSTOLIC BLOOD PRESSURE: 140 MMHG | OXYGEN SATURATION: 97 % | DIASTOLIC BLOOD PRESSURE: 80 MMHG | TEMPERATURE: 97.8 F | HEART RATE: 86 BPM | WEIGHT: 203 LBS

## 2022-03-10 PROCEDURE — 99214 OFFICE O/P EST MOD 30 MIN: CPT

## 2022-03-10 NOTE — HISTORY OF PRESENT ILLNESS
[Diabetes Mellitus] : Diabetes Mellitus [Hyperlipidemia] : Hyperlipidemia [Hypertension] : Hypertension [Other: ___] : [unfilled] [Check glucose ___ x/week] : Patient checks blood glucose [unfilled]  times a week [Most Recent A1C: ___] : Most recent A1C was [unfilled] [Checks BP Sporadically] : The patient checks ~his/her~ blood pressure sporadically [<140/90] : Target blood pressure is <140/90 [Target goal met] : BP target goal met [Doing Well] : Patient is doing well [Managed with medications] : managed with  medication [High Intensity therapy] : Patient is currently on high intensity statin therapy [Well Controlled] : Overall status has been well controlled [FreeTextEntry6] : feeling well going to gym and exercising  [de-identified] : ave 140  [Symptoms Limit Activities] : Symptoms do not limit ~his/her~ activities [FreeTextEntry1] : exercising  regular  In my judgment no risk for PPH has been identified at this time.

## 2022-03-10 NOTE — ASSESSMENT
[FreeTextEntry1] : dm continue seemglee and nova;og  hld continue atorvastatin hrn continue losartan and metoprolol cad  continue asa lab evaluation\par

## 2022-03-12 ENCOUNTER — TRANSCRIPTION ENCOUNTER (OUTPATIENT)
Age: 67
End: 2022-03-12

## 2022-03-12 LAB
ALBUMIN SERPL ELPH-MCNC: 4.8 G/DL
ALP BLD-CCNC: 82 U/L
ALT SERPL-CCNC: 22 U/L
ANION GAP SERPL CALC-SCNC: 12 MMOL/L
APPEARANCE: CLEAR
AST SERPL-CCNC: 21 U/L
BACTERIA: NEGATIVE
BASOPHILS # BLD AUTO: 0.06 K/UL
BASOPHILS NFR BLD AUTO: 0.7 %
BILIRUB SERPL-MCNC: 1 MG/DL
BILIRUBIN URINE: NEGATIVE
BLOOD URINE: NEGATIVE
BUN SERPL-MCNC: 9 MG/DL
CALCIUM SERPL-MCNC: 9.9 MG/DL
CHLORIDE SERPL-SCNC: 101 MMOL/L
CHOLEST SERPL-MCNC: 132 MG/DL
CO2 SERPL-SCNC: 25 MMOL/L
COLOR: YELLOW
CREAT SERPL-MCNC: 0.78 MG/DL
CREAT SPEC-SCNC: 105 MG/DL
EGFR: 99 ML/MIN/1.73M2
EOSINOPHIL # BLD AUTO: 0.11 K/UL
EOSINOPHIL NFR BLD AUTO: 1.2 %
ESTIMATED AVERAGE GLUCOSE: 157 MG/DL
GLUCOSE QUALITATIVE U: NEGATIVE
GLUCOSE SERPL-MCNC: 167 MG/DL
HBA1C MFR BLD HPLC: 7.1 %
HCT VFR BLD CALC: 47.6 %
HDLC SERPL-MCNC: 41 MG/DL
HGB BLD-MCNC: 15.9 G/DL
HYALINE CASTS: 0 /LPF
IMM GRANULOCYTES NFR BLD AUTO: 0.4 %
KETONES URINE: NEGATIVE
LDLC SERPL CALC-MCNC: 66 MG/DL
LEUKOCYTE ESTERASE URINE: NEGATIVE
LYMPHOCYTES # BLD AUTO: 2.44 K/UL
LYMPHOCYTES NFR BLD AUTO: 27 %
MAN DIFF?: NORMAL
MCHC RBC-ENTMCNC: 32.1 PG
MCHC RBC-ENTMCNC: 33.4 GM/DL
MCV RBC AUTO: 96 FL
MICROALBUMIN 24H UR DL<=1MG/L-MCNC: <1.2 MG/DL
MICROALBUMIN/CREAT 24H UR-RTO: NORMAL MG/G
MICROSCOPIC-UA: NORMAL
MONOCYTES # BLD AUTO: 0.64 K/UL
MONOCYTES NFR BLD AUTO: 7.1 %
NEUTROPHILS # BLD AUTO: 5.74 K/UL
NEUTROPHILS NFR BLD AUTO: 63.6 %
NITRITE URINE: NEGATIVE
NONHDLC SERPL-MCNC: 91 MG/DL
PH URINE: 7
PLATELET # BLD AUTO: 273 K/UL
POTASSIUM SERPL-SCNC: 5.2 MMOL/L
PROT SERPL-MCNC: 7.1 G/DL
PROTEIN URINE: NEGATIVE
RBC # BLD: 4.96 M/UL
RBC # FLD: 12 %
RED BLOOD CELLS URINE: 0 /HPF
SODIUM SERPL-SCNC: 138 MMOL/L
SPECIFIC GRAVITY URINE: 1.01
SQUAMOUS EPITHELIAL CELLS: 0 /HPF
T4 SERPL-MCNC: 6.5 UG/DL
TRIGL SERPL-MCNC: 125 MG/DL
TSH SERPL-ACNC: 3.03 UIU/ML
URATE SERPL-MCNC: 3.4 MG/DL
UROBILINOGEN URINE: NORMAL
WBC # FLD AUTO: 9.03 K/UL
WHITE BLOOD CELLS URINE: 0 /HPF

## 2022-04-18 ENCOUNTER — APPOINTMENT (OUTPATIENT)
Dept: FAMILY MEDICINE | Facility: CLINIC | Age: 67
End: 2022-04-18
Payer: COMMERCIAL

## 2022-04-18 VITALS
BODY MASS INDEX: 26.37 KG/M2 | HEIGHT: 73 IN | SYSTOLIC BLOOD PRESSURE: 160 MMHG | OXYGEN SATURATION: 97 % | TEMPERATURE: 97.3 F | DIASTOLIC BLOOD PRESSURE: 74 MMHG | WEIGHT: 199 LBS | HEART RATE: 81 BPM

## 2022-04-18 VITALS — HEART RATE: 72 BPM | DIASTOLIC BLOOD PRESSURE: 80 MMHG | SYSTOLIC BLOOD PRESSURE: 138 MMHG | RESPIRATION RATE: 16 BRPM

## 2022-04-18 PROCEDURE — 99072 ADDL SUPL MATRL&STAF TM PHE: CPT

## 2022-04-18 PROCEDURE — 99214 OFFICE O/P EST MOD 30 MIN: CPT
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
## 2022-04-18 NOTE — PHYSICAL EXAM
[No Acute Distress] : no acute distress [PERRL] : pupils equal round and reactive to light [Normal Oropharynx] : the oropharynx was normal [Supple] : supple [Clear to Auscultation] : lungs were clear to auscultation bilaterally [Regular Rhythm] : with a regular rhythm [No Edema] : there was no peripheral edema [Normal] : soft, non-tender, non-distended, no masses palpated, no HSM and normal bowel sounds [Normal Supraclavicular Nodes] : no supraclavicular lymphadenopathy [Normal Posterior Cervical Nodes] : no posterior cervical lymphadenopathy [Normal Anterior Cervical Nodes] : no anterior cervical lymphadenopathy [No Spinal Tenderness] : no spinal tenderness [No Focal Deficits] : no focal deficits [Normal Affect] : the affect was normal [de-identified] : NO CHEST  TENDERNESS +
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
## 2022-04-19 LAB
ALBUMIN SERPL ELPH-MCNC: 4.3 G/DL
ALP BLD-CCNC: 201 U/L
ALT SERPL-CCNC: 66 U/L
ANION GAP SERPL CALC-SCNC: 13 MMOL/L
APPEARANCE: CLEAR
AST SERPL-CCNC: 56 U/L
BACTERIA: NEGATIVE
BASOPHILS # BLD AUTO: 0.04 K/UL
BASOPHILS NFR BLD AUTO: 0.4 %
BILIRUB SERPL-MCNC: 0.5 MG/DL
BILIRUBIN URINE: NEGATIVE
BLOOD URINE: NEGATIVE
BUN SERPL-MCNC: 10 MG/DL
CALCIUM SERPL-MCNC: 9.8 MG/DL
CHLORIDE SERPL-SCNC: 99 MMOL/L
CHOLEST SERPL-MCNC: 139 MG/DL
CO2 SERPL-SCNC: 24 MMOL/L
COLOR: NORMAL
CREAT SERPL-MCNC: 0.62 MG/DL
CREAT SPEC-SCNC: 82 MG/DL
EGFR: 106 ML/MIN/1.73M2
EOSINOPHIL # BLD AUTO: 0.06 K/UL
EOSINOPHIL NFR BLD AUTO: 0.7 %
ESTIMATED AVERAGE GLUCOSE: 166 MG/DL
GLUCOSE QUALITATIVE U: ABNORMAL
GLUCOSE SERPL-MCNC: 220 MG/DL
HBA1C MFR BLD HPLC: 7.4 %
HCT VFR BLD CALC: 43.4 %
HDLC SERPL-MCNC: 40 MG/DL
HGB BLD-MCNC: 14.8 G/DL
HYALINE CASTS: 0 /LPF
IMM GRANULOCYTES NFR BLD AUTO: 0.4 %
KETONES URINE: NEGATIVE
LDLC SERPL CALC-MCNC: 52 MG/DL
LEUKOCYTE ESTERASE URINE: NEGATIVE
LYMPHOCYTES # BLD AUTO: 1.19 K/UL
LYMPHOCYTES NFR BLD AUTO: 12.9 %
MAN DIFF?: NORMAL
MCHC RBC-ENTMCNC: 31.4 PG
MCHC RBC-ENTMCNC: 34.1 GM/DL
MCV RBC AUTO: 92.1 FL
MICROALBUMIN 24H UR DL<=1MG/L-MCNC: 3.1 MG/DL
MICROALBUMIN/CREAT 24H UR-RTO: 38 MG/G
MICROSCOPIC-UA: NORMAL
MONOCYTES # BLD AUTO: 0.45 K/UL
MONOCYTES NFR BLD AUTO: 4.9 %
NEUTROPHILS # BLD AUTO: 7.41 K/UL
NEUTROPHILS NFR BLD AUTO: 80.7 %
NITRITE URINE: NEGATIVE
NONHDLC SERPL-MCNC: 99 MG/DL
PH URINE: 6.5
PLATELET # BLD AUTO: 280 K/UL
POTASSIUM SERPL-SCNC: 4.7 MMOL/L
PROT SERPL-MCNC: 6.8 G/DL
PROTEIN URINE: NORMAL
RBC # BLD: 4.71 M/UL
RBC # FLD: 11.8 %
RED BLOOD CELLS URINE: 1 /HPF
SODIUM SERPL-SCNC: 136 MMOL/L
SPECIFIC GRAVITY URINE: 1.02
SQUAMOUS EPITHELIAL CELLS: 0 /HPF
T4 SERPL-MCNC: 6.7 UG/DL
TRIGL SERPL-MCNC: 238 MG/DL
TSH SERPL-ACNC: 2.42 UIU/ML
URATE SERPL-MCNC: 2.5 MG/DL
UROBILINOGEN URINE: NORMAL
WBC # FLD AUTO: 9.19 K/UL
WHITE BLOOD CELLS URINE: 0 /HPF

## 2022-04-23 LAB — LEVETIRACETAM SERPL-MCNC: 22.8 UG/ML

## 2022-05-05 ENCOUNTER — APPOINTMENT (OUTPATIENT)
Dept: FAMILY MEDICINE | Facility: CLINIC | Age: 67
End: 2022-05-05
Payer: COMMERCIAL

## 2022-05-05 VITALS
DIASTOLIC BLOOD PRESSURE: 80 MMHG | HEIGHT: 73 IN | OXYGEN SATURATION: 98 % | TEMPERATURE: 97.6 F | WEIGHT: 202 LBS | SYSTOLIC BLOOD PRESSURE: 122 MMHG | HEART RATE: 76 BPM | BODY MASS INDEX: 26.77 KG/M2

## 2022-05-05 VITALS — SYSTOLIC BLOOD PRESSURE: 130 MMHG | RESPIRATION RATE: 16 BRPM | HEART RATE: 74 BPM | DIASTOLIC BLOOD PRESSURE: 70 MMHG

## 2022-05-05 PROCEDURE — 99214 OFFICE O/P EST MOD 30 MIN: CPT

## 2022-05-05 PROCEDURE — 99072 ADDL SUPL MATRL&STAF TM PHE: CPT
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
## 2022-05-05 NOTE — ASSESSMENT
[FreeTextEntry1] : GLUCOSE HAS  BEEN GOOD USING  SIOMARA SENSOR 2  CONTINUE ASA FOR CAD ELIQUIS FOR DVT NEURO F/U FOR SEIZURES

## 2022-05-05 NOTE — PHYSICAL EXAM
[No Acute Distress] : no acute distress [PERRL] : pupils equal round and reactive to light [Normal Oropharynx] : the oropharynx was normal [Supple] : supple [Clear to Auscultation] : lungs were clear to auscultation bilaterally [Regular Rhythm] : with a regular rhythm [No Edema] : there was no peripheral edema [Normal] : soft, non-tender, non-distended, no masses palpated, no HSM and normal bowel sounds [Normal Supraclavicular Nodes] : no supraclavicular lymphadenopathy [Normal Posterior Cervical Nodes] : no posterior cervical lymphadenopathy [Normal Anterior Cervical Nodes] : no anterior cervical lymphadenopathy [No Rash] : no rash [Normal Gait] : normal gait [Normal Insight/Judgement] : insight and judgment were intact [de-identified] : RITH SIDED  RIB PAIN GONE

## 2022-06-09 ENCOUNTER — APPOINTMENT (OUTPATIENT)
Dept: FAMILY MEDICINE | Facility: CLINIC | Age: 67
End: 2022-06-09
Payer: COMMERCIAL

## 2022-06-09 VITALS — HEART RATE: 72 BPM | RESPIRATION RATE: 16 BRPM | DIASTOLIC BLOOD PRESSURE: 60 MMHG | SYSTOLIC BLOOD PRESSURE: 90 MMHG

## 2022-06-09 DIAGNOSIS — I63.9 CEREBRAL INFARCTION, UNSPECIFIED: ICD-10-CM

## 2022-06-09 LAB — GLUCOSE BLDC GLUCOMTR-MCNC: 336

## 2022-06-09 PROCEDURE — 99215 OFFICE O/P EST HI 40 MIN: CPT | Mod: 25

## 2022-06-09 PROCEDURE — 82962 GLUCOSE BLOOD TEST: CPT

## 2022-06-09 PROCEDURE — 99072 ADDL SUPL MATRL&STAF TM PHE: CPT
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
## 2022-06-09 NOTE — PHYSICAL EXAM
[No Acute Distress] : no acute distress [PERRL] : pupils equal round and reactive to light [Normal TMs] : both tympanic membranes were normal [Supple] : supple [Clear to Auscultation] : lungs were clear to auscultation bilaterally [Regular Rhythm] : with a regular rhythm [No Edema] : there was no peripheral edema [Normal Supraclavicular Nodes] : no supraclavicular lymphadenopathy [Normal Anterior Cervical Nodes] : no anterior cervical lymphadenopathy [No CVA Tenderness] : no CVA  tenderness [No Rash] : no rash [de-identified] : confused rt sided weakness unable to stand

## 2022-06-09 NOTE — ASSESSMENT
[FreeTextEntry1] : pt  with altered  mental status  on  eliquis for dvt also has  subdural hygroma pt referred to stacey now

## 2022-10-21 NOTE — AIRWAY REMOVAL NOTE  ADULT & PEDS - ARTIFICAL AIRWAY REMOVAL COMMENTS
Toric IOL was discussed as needed for full correction of astigmatism. Discussed positioning and rotational stability, along with possibility of requiring rotation of the lens. Written order for extubation verified. The patient was identified by full name and birth date compared to the identification band. Present during the procedure was carine latif RN

## 2022-11-09 ENCOUNTER — APPOINTMENT (OUTPATIENT)
Dept: CARDIOLOGY | Facility: CLINIC | Age: 67
End: 2022-11-09

## 2022-11-09 VITALS
SYSTOLIC BLOOD PRESSURE: 156 MMHG | DIASTOLIC BLOOD PRESSURE: 84 MMHG | BODY MASS INDEX: 28.63 KG/M2 | HEART RATE: 62 BPM | HEIGHT: 73 IN | WEIGHT: 216 LBS | OXYGEN SATURATION: 98 %

## 2022-11-09 VITALS — DIASTOLIC BLOOD PRESSURE: 84 MMHG | SYSTOLIC BLOOD PRESSURE: 146 MMHG

## 2022-11-09 PROCEDURE — 93000 ELECTROCARDIOGRAM COMPLETE: CPT

## 2022-11-09 PROCEDURE — 99214 OFFICE O/P EST MOD 30 MIN: CPT | Mod: 25

## 2022-11-09 RX ORDER — ASPIRIN 81 MG
81 TABLET, DELAYED RELEASE (ENTERIC COATED) ORAL DAILY
Refills: 0 | Status: COMPLETED | COMMUNITY
End: 2022-02-15

## 2022-11-09 RX ORDER — APIXABAN 5 MG/1
5 TABLET, FILM COATED ORAL
Qty: 60 | Refills: 3 | Status: COMPLETED | COMMUNITY
Start: 2022-04-18 | End: 2022-11-09

## 2022-11-09 NOTE — PHYSICAL EXAM
[Normal S1, S2] : normal S1, S2 [Clear Lung Fields] : clear lung fields [No Edema] : no edema [Normal Speech] : normal speech [Alert and Oriented] : alert and oriented [de-identified] : Overweifht [de-identified] : LONA [de-identified] : Wearing a face mask [de-identified] : No JVD

## 2022-11-09 NOTE — REVIEW OF SYSTEMS
[Weight Gain (___ Lbs)] : [unfilled] ~Ulb weight gain [SOB] : no shortness of breath [Dyspnea on exertion] : not dyspnea during exertion [Chest Discomfort] : no chest discomfort [Palpitations] : no palpitations [de-identified] : Intermittently feels "off balance"

## 2022-11-09 NOTE — DISCUSSION/SUMMARY
[With Me] : with me [___ Year(s)] : in [unfilled] year(s) [FreeTextEntry1] : \par Coronary artery disease: Stable; continue atorvastatin, and metoprolol --  he will discuss reinitiation of aspirin 81 mg daily when he sees his  neurosurgical team later this month.\par \par Hypertension:  blood pressure mildly elevated–he reports lower measurements at home; continue present doses of metoprolol, losartan, and HCTZ.  If blood pressure were to rise I would consider addition of amlodipine (he will call me with home measurement if elevated).\par \par Hyperlipidemia: Controlled; continue atorvastatin.\par \par Abnormal ECG: Right bundle branch block his pre-existing; base ECG is similar to previous tracings\par
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
## 2022-11-14 ENCOUNTER — APPOINTMENT (OUTPATIENT)
Dept: FAMILY MEDICINE | Facility: CLINIC | Age: 67
End: 2022-11-14

## 2022-11-14 VITALS
HEIGHT: 73 IN | TEMPERATURE: 96 F | DIASTOLIC BLOOD PRESSURE: 72 MMHG | OXYGEN SATURATION: 96 % | SYSTOLIC BLOOD PRESSURE: 149 MMHG | WEIGHT: 213 LBS | BODY MASS INDEX: 28.23 KG/M2 | HEART RATE: 80 BPM

## 2022-11-14 VITALS — HEART RATE: 74 BPM | SYSTOLIC BLOOD PRESSURE: 130 MMHG | DIASTOLIC BLOOD PRESSURE: 80 MMHG | RESPIRATION RATE: 16 BRPM

## 2022-11-14 PROCEDURE — 99397 PER PM REEVAL EST PAT 65+ YR: CPT | Mod: 25

## 2022-11-14 PROCEDURE — G0444 DEPRESSION SCREEN ANNUAL: CPT | Mod: 59

## 2022-11-14 NOTE — PHYSICAL EXAM
[No Acute Distress] : no acute distress [PERRL] : pupils equal round and reactive to light [Normal TMs] : both tympanic membranes were normal [Supple] : supple [Clear to Auscultation] : lungs were clear to auscultation bilaterally [Regular Rhythm] : with a regular rhythm [No Edema] : there was no peripheral edema [Normal Sphincter Tone] : normal sphincter tone [No Mass] : no mass [Urethral Meatus] : meatus normal [Urinary Bladder Findings] : the bladder was normal on palpation [Scrotum] : the scrotum was normal [Testes Mass (___cm)] : there were no testicular masses [No Prostate Nodules] : no prostate nodules [Normal Supraclavicular Nodes] : no supraclavicular lymphadenopathy [Normal Axillary Nodes] : no axillary lymphadenopathy [Normal Anterior Cervical Nodes] : no anterior cervical lymphadenopathy [No Spinal Tenderness] : no spinal tenderness [Normal] : no rash [No Focal Deficits] : no focal deficits [Normal Insight/Judgement] : insight and judgment were intact
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
## 2022-11-14 NOTE — ASSESSMENT
[FreeTextEntry1] : bp acceptable continue losartan and metoprolol dm continue glargine   hld continue atorvastatin no seizures continue keppra

## 2022-11-14 NOTE — HEALTH RISK ASSESSMENT
[Very Good] : ~his/her~  mood as very good [Never] : Never [Yes] : Yes [2 - 4 times a month (2 pts)] : 2-4 times a month (2 points) [No falls in past year] : Patient reported no falls in the past year [0] : 2) Feeling down, depressed, or hopeless: Not at all (0) [PHQ-2 Negative - No further assessment needed] : PHQ-2 Negative - No further assessment needed [None] : None [Alone] : lives alone [Employed] : employed [College] : College [] :  [# Of Children ___] : has [unfilled] children [Sexually Active] : sexually active [Smoke Detector] : smoke detector [Carbon Monoxide Detector] : carbon monoxide detector [Seat Belt] :  uses seat belt [de-identified] : REGULAR  [[id number]] : 0 [Change in mental status noted] : No change in mental status noted [Reports changes in hearing] : Reports no changes in hearing [Reports changes in vision] : Reports no changes in vision [Reports changes in dental health] : Reports no changes in dental health [ColonoscopyDate] : 6/15  [de-identified] :

## 2022-11-15 ENCOUNTER — TRANSCRIPTION ENCOUNTER (OUTPATIENT)
Age: 67
End: 2022-11-15

## 2022-11-15 LAB
ALBUMIN SERPL ELPH-MCNC: 4.6 G/DL
ALP BLD-CCNC: 91 U/L
ALT SERPL-CCNC: 21 U/L
ANION GAP SERPL CALC-SCNC: 16 MMOL/L
APPEARANCE: CLEAR
AST SERPL-CCNC: 21 U/L
BACTERIA: NEGATIVE
BASOPHILS # BLD AUTO: 0.04 K/UL
BASOPHILS NFR BLD AUTO: 0.6 %
BILIRUB SERPL-MCNC: 0.7 MG/DL
BILIRUBIN URINE: NEGATIVE
BLOOD URINE: NEGATIVE
BUN SERPL-MCNC: 8 MG/DL
CALCIUM SERPL-MCNC: 9.5 MG/DL
CHLORIDE SERPL-SCNC: 99 MMOL/L
CHOLEST SERPL-MCNC: 86 MG/DL
CO2 SERPL-SCNC: 25 MMOL/L
COLOR: NORMAL
CREAT SERPL-MCNC: 0.71 MG/DL
CREAT SPEC-SCNC: 64 MG/DL
EGFR: 101 ML/MIN/1.73M2
EOSINOPHIL # BLD AUTO: 0.05 K/UL
EOSINOPHIL NFR BLD AUTO: 0.8 %
ESTIMATED AVERAGE GLUCOSE: 160 MG/DL
GLUCOSE QUALITATIVE U: NEGATIVE
GLUCOSE SERPL-MCNC: 165 MG/DL
HBA1C MFR BLD HPLC: 7.2 %
HCT VFR BLD CALC: 43.7 %
HDLC SERPL-MCNC: 29 MG/DL
HGB BLD-MCNC: 14.7 G/DL
HYALINE CASTS: 0 /LPF
IMM GRANULOCYTES NFR BLD AUTO: 0.3 %
KETONES URINE: NEGATIVE
LDLC SERPL CALC-MCNC: 33 MG/DL
LEUKOCYTE ESTERASE URINE: NEGATIVE
LYMPHOCYTES # BLD AUTO: 2 K/UL
LYMPHOCYTES NFR BLD AUTO: 30.3 %
MAN DIFF?: NORMAL
MCHC RBC-ENTMCNC: 30.7 PG
MCHC RBC-ENTMCNC: 33.6 GM/DL
MCV RBC AUTO: 91.2 FL
MICROALBUMIN 24H UR DL<=1MG/L-MCNC: <1.2 MG/DL
MICROALBUMIN/CREAT 24H UR-RTO: NORMAL MG/G
MICROSCOPIC-UA: NORMAL
MONOCYTES # BLD AUTO: 0.49 K/UL
MONOCYTES NFR BLD AUTO: 7.4 %
NEUTROPHILS # BLD AUTO: 3.99 K/UL
NEUTROPHILS NFR BLD AUTO: 60.6 %
NITRITE URINE: NEGATIVE
NONHDLC SERPL-MCNC: 56 MG/DL
PH URINE: 6
PLATELET # BLD AUTO: 231 K/UL
POTASSIUM SERPL-SCNC: 4.8 MMOL/L
PROT SERPL-MCNC: 7 G/DL
PROTEIN URINE: NEGATIVE
PSA SERPL-MCNC: 0.89 NG/ML
RBC # BLD: 4.79 M/UL
RBC # FLD: 12.2 %
RED BLOOD CELLS URINE: 0 /HPF
SODIUM SERPL-SCNC: 140 MMOL/L
SPECIFIC GRAVITY URINE: 1.01
SQUAMOUS EPITHELIAL CELLS: 0 /HPF
T4 SERPL-MCNC: 6.8 UG/DL
TRIGL SERPL-MCNC: 118 MG/DL
TSH SERPL-ACNC: 3.48 UIU/ML
URATE SERPL-MCNC: 3.9 MG/DL
UROBILINOGEN URINE: NORMAL
WBC # FLD AUTO: 6.59 K/UL
WHITE BLOOD CELLS URINE: 1 /HPF

## 2022-11-18 LAB — LEVETIRACETAM SERPL-MCNC: 18.3 UG/ML

## 2022-12-01 ENCOUNTER — TRANSCRIPTION ENCOUNTER (OUTPATIENT)
Age: 67
End: 2022-12-01

## 2022-12-01 LAB — HEMOCCULT STL QL IA: NEGATIVE

## 2022-12-02 ENCOUNTER — RX RENEWAL (OUTPATIENT)
Age: 67
End: 2022-12-02

## 2023-01-09 ENCOUNTER — APPOINTMENT (OUTPATIENT)
Dept: FAMILY MEDICINE | Facility: CLINIC | Age: 68
End: 2023-01-09
Payer: COMMERCIAL

## 2023-01-09 DIAGNOSIS — Z23 ENCOUNTER FOR IMMUNIZATION: ICD-10-CM

## 2023-01-09 PROCEDURE — 90750 HZV VACC RECOMBINANT IM: CPT

## 2023-01-09 PROCEDURE — 90471 IMMUNIZATION ADMIN: CPT

## 2023-03-06 ENCOUNTER — RX RENEWAL (OUTPATIENT)
Age: 68
End: 2023-03-06

## 2023-05-12 ENCOUNTER — APPOINTMENT (OUTPATIENT)
Dept: FAMILY MEDICINE | Facility: CLINIC | Age: 68
End: 2023-05-12
Payer: COMMERCIAL

## 2023-05-12 ENCOUNTER — MED ADMIN CHARGE (OUTPATIENT)
Age: 68
End: 2023-05-12

## 2023-05-12 PROCEDURE — 90471 IMMUNIZATION ADMIN: CPT

## 2023-05-12 PROCEDURE — 90750 HZV VACC RECOMBINANT IM: CPT

## 2023-06-15 ENCOUNTER — APPOINTMENT (OUTPATIENT)
Dept: FAMILY MEDICINE | Facility: CLINIC | Age: 68
End: 2023-06-15
Payer: COMMERCIAL

## 2023-06-15 VITALS
HEART RATE: 81 BPM | WEIGHT: 224 LBS | HEIGHT: 73 IN | OXYGEN SATURATION: 96 % | BODY MASS INDEX: 29.69 KG/M2 | DIASTOLIC BLOOD PRESSURE: 80 MMHG | SYSTOLIC BLOOD PRESSURE: 140 MMHG | TEMPERATURE: 97.3 F | RESPIRATION RATE: 16 BRPM

## 2023-06-15 VITALS — HEART RATE: 72 BPM | SYSTOLIC BLOOD PRESSURE: 140 MMHG | RESPIRATION RATE: 16 BRPM | DIASTOLIC BLOOD PRESSURE: 80 MMHG
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
## 2023-06-15 DIAGNOSIS — S30.861A INSECT BITE (NONVENOMOUS) OF ABDOMINAL WALL, INITIAL ENCOUNTER: ICD-10-CM

## 2023-06-15 PROCEDURE — 99213 OFFICE O/P EST LOW 20 MIN: CPT

## 2023-06-15 NOTE — PHYSICAL EXAM
[No Acute Distress] : no acute distress [Clear to Auscultation] : lungs were clear to auscultation bilaterally [Regular Rhythm] : with a regular rhythm [de-identified] : tick bite rt side of abd

## 2023-09-05 ENCOUNTER — RX RENEWAL (OUTPATIENT)
Age: 68
End: 2023-09-05

## 2023-10-29 ENCOUNTER — NON-APPOINTMENT (OUTPATIENT)
Age: 68
End: 2023-10-29

## 2023-11-01 ENCOUNTER — APPOINTMENT (OUTPATIENT)
Dept: GASTROENTEROLOGY | Facility: CLINIC | Age: 68
End: 2023-11-01

## 2023-11-01 ENCOUNTER — APPOINTMENT (OUTPATIENT)
Dept: GASTROENTEROLOGY | Facility: CLINIC | Age: 68
End: 2023-11-01
Payer: COMMERCIAL

## 2023-11-01 VITALS
HEART RATE: 70 BPM | OXYGEN SATURATION: 98 % | WEIGHT: 224 LBS | SYSTOLIC BLOOD PRESSURE: 126 MMHG | HEIGHT: 73 IN | RESPIRATION RATE: 16 BRPM | BODY MASS INDEX: 29.69 KG/M2 | DIASTOLIC BLOOD PRESSURE: 72 MMHG

## 2023-11-01 DIAGNOSIS — S22.49XA MULTIPLE FRACTURES OF RIBS, UNSPECIFIED SIDE, INITIAL ENCOUNTER FOR CLOSED FRACTURE: ICD-10-CM

## 2023-11-01 DIAGNOSIS — I82.409 ACUTE EMBOLISM AND THROMBOSIS OF UNSPECIFIED DEEP VEINS OF UNSPECIFIED LOWER EXTREMITY: ICD-10-CM

## 2023-11-01 DIAGNOSIS — R56.9 UNSPECIFIED CONVULSIONS: ICD-10-CM

## 2023-11-01 DIAGNOSIS — G96.08 OTHER CRANIAL CEREBROSPINAL FLUID LEAK: ICD-10-CM

## 2023-11-01 DIAGNOSIS — N52.9 MALE ERECTILE DYSFUNCTION, UNSPECIFIED: ICD-10-CM
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
## 2023-11-01 PROCEDURE — 99203 OFFICE O/P NEW LOW 30 MIN: CPT

## 2023-11-01 RX ORDER — LEVETIRACETAM 1000 MG/1
1000 TABLET, FILM COATED ORAL DAILY
Refills: 0 | Status: DISCONTINUED | COMMUNITY
Start: 2022-04-18 | End: 2023-11-01

## 2023-11-08 ENCOUNTER — APPOINTMENT (OUTPATIENT)
Dept: CARDIOLOGY | Facility: CLINIC | Age: 68
End: 2023-11-08
Payer: COMMERCIAL

## 2023-11-08 ENCOUNTER — NON-APPOINTMENT (OUTPATIENT)
Age: 68
End: 2023-11-08

## 2023-11-08 VITALS
DIASTOLIC BLOOD PRESSURE: 82 MMHG | WEIGHT: 225 LBS | BODY MASS INDEX: 29.82 KG/M2 | OXYGEN SATURATION: 95 % | HEART RATE: 65 BPM | SYSTOLIC BLOOD PRESSURE: 169 MMHG | HEIGHT: 73 IN

## 2023-11-08 VITALS — SYSTOLIC BLOOD PRESSURE: 158 MMHG | DIASTOLIC BLOOD PRESSURE: 76 MMHG

## 2023-11-08 DIAGNOSIS — I45.10 UNSPECIFIED RIGHT BUNDLE-BRANCH BLOCK: ICD-10-CM

## 2023-11-08 DIAGNOSIS — Z01.810 ENCOUNTER FOR PREPROCEDURAL CARDIOVASCULAR EXAMINATION: ICD-10-CM

## 2023-11-08 PROCEDURE — 99214 OFFICE O/P EST MOD 30 MIN: CPT | Mod: 25

## 2023-11-08 PROCEDURE — 93000 ELECTROCARDIOGRAM COMPLETE: CPT

## 2024-01-29 LAB
ANION GAP SERPL CALC-SCNC: 15 MMOL/L
BUN SERPL-MCNC: 14 MG/DL
CALCIUM SERPL-MCNC: 9.8 MG/DL
CHLORIDE SERPL-SCNC: 101 MMOL/L
CO2 SERPL-SCNC: 24 MMOL/L
CREAT SERPL-MCNC: 0.8 MG/DL
EGFR: 97 ML/MIN/1.73M2
GLUCOSE SERPL-MCNC: 121 MG/DL
HCT VFR BLD CALC: 43.5 %
HGB BLD-MCNC: 15 G/DL
INR PPP: 0.92 RATIO
MCHC RBC-ENTMCNC: 31.8 PG
MCHC RBC-ENTMCNC: 34.5 GM/DL
MCV RBC AUTO: 92.4 FL
PLATELET # BLD AUTO: 270 K/UL
POTASSIUM SERPL-SCNC: 4.8 MMOL/L
PT BLD: 10.4 SEC
RBC # BLD: 4.71 M/UL
RBC # FLD: 12.2 %
SODIUM SERPL-SCNC: 140 MMOL/L
WBC # FLD AUTO: 9.35 K/UL

## 2024-01-31 ENCOUNTER — RX CHANGE (OUTPATIENT)
Age: 69
End: 2024-01-31

## 2024-01-31 ENCOUNTER — APPOINTMENT (OUTPATIENT)
Dept: FAMILY MEDICINE | Facility: CLINIC | Age: 69
End: 2024-01-31
Payer: COMMERCIAL

## 2024-01-31 VITALS
HEART RATE: 82 BPM | OXYGEN SATURATION: 98 % | DIASTOLIC BLOOD PRESSURE: 70 MMHG | TEMPERATURE: 98.3 F | WEIGHT: 222 LBS | HEIGHT: 73 IN | SYSTOLIC BLOOD PRESSURE: 156 MMHG | BODY MASS INDEX: 29.42 KG/M2

## 2024-01-31 VITALS — RESPIRATION RATE: 16 BRPM | DIASTOLIC BLOOD PRESSURE: 78 MMHG | HEART RATE: 80 BPM | SYSTOLIC BLOOD PRESSURE: 138 MMHG

## 2024-01-31 DIAGNOSIS — E78.5 HYPERLIPIDEMIA, UNSPECIFIED: ICD-10-CM

## 2024-01-31 DIAGNOSIS — E11.9 TYPE 2 DIABETES MELLITUS W/OUT COMPLICATIONS: ICD-10-CM

## 2024-01-31 DIAGNOSIS — I10 ESSENTIAL (PRIMARY) HYPERTENSION: ICD-10-CM

## 2024-01-31 DIAGNOSIS — I25.10 ATHEROSCLEROTIC HEART DISEASE OF NATIVE CORONARY ARTERY W/OUT ANGINA PECTORIS: ICD-10-CM

## 2024-01-31 DIAGNOSIS — Z95.1 PRESENCE OF AORTOCORONARY BYPASS GRAFT: ICD-10-CM

## 2024-01-31 PROCEDURE — 99397 PER PM REEVAL EST PAT 65+ YR: CPT

## 2024-01-31 RX ORDER — EFINACONAZOLE 100 MG/ML
10 SOLUTION TOPICAL TWICE DAILY
Qty: 1 | Refills: 3 | Status: COMPLETED | COMMUNITY
Start: 2022-11-14 | End: 2024-01-31

## 2024-01-31 RX ORDER — SODIUM PICOSULFATE, MAGNESIUM OXIDE, AND ANHYDROUS CITRIC ACID 10; 3.5; 12 MG/160ML; G/160ML; G/160ML
10-3.5-12 MG-GM LIQUID ORAL
Qty: 2 | Refills: 0 | Status: COMPLETED | COMMUNITY
Start: 2023-11-01 | End: 2024-01-31

## 2024-01-31 RX ORDER — INSULIN GLARGINE-YFGN 100 [IU]/ML
100 INJECTION, SOLUTION SUBCUTANEOUS DAILY
Qty: 7 | Refills: 3 | Status: COMPLETED | COMMUNITY
Start: 2023-01-12 | End: 2024-01-31

## 2024-01-31 RX ORDER — SYRINGE AND NEEDLE,INSULIN,1ML 31 GX5/16"
31G X 5/16" SYRINGE, EMPTY DISPOSABLE MISCELLANEOUS
Qty: 90 | Refills: 3 | Status: ACTIVE | COMMUNITY
Start: 2019-10-21 | End: 1900-01-01

## 2024-01-31 RX ORDER — INSULIN GLARGINE 100 [IU]/ML
100 INJECTION, SOLUTION SUBCUTANEOUS
Qty: 3 | Refills: 0 | Status: DISCONTINUED | COMMUNITY
Start: 2024-01-31 | End: 2024-01-31

## 2024-01-31 RX ORDER — LANCETS
EACH MISCELLANEOUS
Qty: 600 | Refills: 3 | Status: COMPLETED | COMMUNITY
Start: 2020-08-31 | End: 2024-01-31

## 2024-01-31 RX ORDER — HYDROCHLOROTHIAZIDE 25 MG/1
25 TABLET ORAL
Qty: 90 | Refills: 3 | Status: ACTIVE | COMMUNITY
Start: 2023-09-05 | End: 1900-01-01

## 2024-01-31 RX ORDER — METOPROLOL SUCCINATE 100 MG/1
100 TABLET, EXTENDED RELEASE ORAL
Qty: 90 | Refills: 3 | Status: ACTIVE | COMMUNITY
Start: 2017-11-17 | End: 1900-01-01

## 2024-01-31 RX ORDER — LOSARTAN POTASSIUM 100 MG/1
100 TABLET, FILM COATED ORAL
Qty: 90 | Refills: 3 | Status: ACTIVE | COMMUNITY
Start: 2020-04-17 | End: 1900-01-01

## 2024-01-31 RX ORDER — KRILL/OM-3/DHA/EPA/PHOSPHO/AST 1000-230MG
81 CAPSULE ORAL
Refills: 0 | Status: ACTIVE | COMMUNITY
Start: 2024-01-31

## 2024-01-31 RX ORDER — FLASH GLUCOSE SENSOR
KIT MISCELLANEOUS
Qty: 2 | Refills: 8 | Status: COMPLETED | COMMUNITY
Start: 2020-12-22 | End: 2024-01-31

## 2024-01-31 RX ORDER — SILDENAFIL 100 MG/1
100 TABLET, FILM COATED ORAL
Qty: 30 | Refills: 2 | Status: ACTIVE | COMMUNITY
Start: 2019-11-05 | End: 1900-01-01

## 2024-01-31 RX ORDER — ATORVASTATIN CALCIUM 40 MG/1
40 TABLET, FILM COATED ORAL DAILY
Qty: 90 | Refills: 3 | Status: ACTIVE | COMMUNITY
Start: 1900-01-01 | End: 1900-01-01

## 2024-01-31 RX ORDER — DOXYCYCLINE HYCLATE 100 MG/1
100 CAPSULE ORAL
Qty: 14 | Refills: 0 | Status: COMPLETED | COMMUNITY
Start: 2023-06-15 | End: 2024-01-31

## 2024-01-31 RX ORDER — LANCETS
EACH MISCELLANEOUS
Qty: 600 | Refills: 3 | Status: COMPLETED | COMMUNITY
Start: 2022-03-10 | End: 2024-01-31

## 2024-01-31 RX ORDER — INSULIN GLARGINE 100 [IU]/ML
100 INJECTION, SOLUTION SUBCUTANEOUS AT BEDTIME
Qty: 70 | Refills: 3 | Status: COMPLETED | COMMUNITY
Start: 2022-03-10 | End: 2024-01-31

## 2024-01-31 NOTE — HEALTH RISK ASSESSMENT
[Yes] : Yes [2 - 4 times a month (2 pts)] : 2-4 times a month (2 points) [1 or 2 (0 pts)] : 1 or 2 (0 points) [No falls in past year] : Patient reported no falls in the past year [0] : 2) Feeling down, depressed, or hopeless: Not at all (0) [PHQ-2 Negative - No further assessment needed] : PHQ-2 Negative - No further assessment needed [de-identified] : CARDIOLOGU  [de-identified] : REGULAR  [de-identified] : DIABETIC  [[id number]] : 0

## 2024-01-31 NOTE — ASSESSMENT
[FreeTextEntry1] : CAD CONTINUE ASA  BP ACCEPTABLE CONTINUE LOSARTAN AND METOPROLOL  DM CONTINUE LANTUS HLD CONTINUE ATORVASTASITIN

## 2024-01-31 NOTE — PHYSICAL EXAM
[No Acute Distress] : no acute distress [PERRL] : pupils equal round and reactive to light [Normal TMs] : both tympanic membranes were normal [Supple] : supple [Clear to Auscultation] : lungs were clear to auscultation bilaterally [Regular Rhythm] : with a regular rhythm [No Edema] : there was no peripheral edema [Normal] : soft, non-tender, non-distended, no masses palpated, no HSM and normal bowel sounds [Normal Supraclavicular Nodes] : no supraclavicular lymphadenopathy [Normal Anterior Cervical Nodes] : no anterior cervical lymphadenopathy [No CVA Tenderness] : no CVA  tenderness [No Joint Swelling] : no joint swelling [No Rash] : no rash [No Focal Deficits] : no focal deficits [Normal Insight/Judgement] : insight and judgment were intact

## 2024-01-31 NOTE — HISTORY OF PRESENT ILLNESS
[Coronary Artery Disease] : Coronary Artery Disease [Diabetes Mellitus] : Diabetes Mellitus [Hyperlipidemia] : Hyperlipidemia [Hypertension] : Hypertension [FreeTextEntry6] : pt here for management of DM HLD HTN HAS COLONOSCOPY SCHEDULED   HAS  1  SON LIVE ALONE WORKS   [de-identified] : checks occ appears foggy glucose  336  [Does not check BP] : The patient is not checking blood pressure [Doing Well] : Patient is doing well [Managed with medications] : managed with  medication [High Intensity therapy] : Patient is currently on high intensity statin therapy [Symptoms Limit Activities] : Symptoms do not limit ~his/her~ activities [de-identified] : NEVER  SMOKED

## 2024-02-01 ENCOUNTER — TRANSCRIPTION ENCOUNTER (OUTPATIENT)
Age: 69
End: 2024-02-01

## 2024-02-01 LAB
ALBUMIN SERPL ELPH-MCNC: 4.6 G/DL
ALP BLD-CCNC: 83 U/L
ALT SERPL-CCNC: 30 U/L
ANION GAP SERPL CALC-SCNC: 13 MMOL/L
APPEARANCE: CLEAR
AST SERPL-CCNC: 22 U/L
BACTERIA: NEGATIVE /HPF
BASOPHILS # BLD AUTO: 0.04 K/UL
BASOPHILS NFR BLD AUTO: 0.5 %
BILIRUB SERPL-MCNC: 0.9 MG/DL
BILIRUBIN URINE: NEGATIVE
BLOOD URINE: NEGATIVE
BUN SERPL-MCNC: 9 MG/DL
CALCIUM SERPL-MCNC: 9.8 MG/DL
CAST: 0 /LPF
CHLORIDE SERPL-SCNC: 104 MMOL/L
CHOLEST SERPL-MCNC: 91 MG/DL
CO2 SERPL-SCNC: 25 MMOL/L
COLOR: YELLOW
CREAT SERPL-MCNC: 0.76 MG/DL
CREAT SPEC-SCNC: 79 MG/DL
EGFR: 99 ML/MIN/1.73M2
EOSINOPHIL # BLD AUTO: 0.3 K/UL
EOSINOPHIL NFR BLD AUTO: 3.8 %
EPITHELIAL CELLS: 0 /HPF
ESTIMATED AVERAGE GLUCOSE: 157 MG/DL
GLUCOSE QUALITATIVE U: NEGATIVE MG/DL
GLUCOSE SERPL-MCNC: 131 MG/DL
HBA1C MFR BLD HPLC: 7.1 %
HCT VFR BLD CALC: 46.4 %
HDLC SERPL-MCNC: 28 MG/DL
HGB BLD-MCNC: 15.5 G/DL
IMM GRANULOCYTES NFR BLD AUTO: 0.1 %
KETONES URINE: NEGATIVE MG/DL
LDLC SERPL CALC-MCNC: 39 MG/DL
LEUKOCYTE ESTERASE URINE: NEGATIVE
LYMPHOCYTES # BLD AUTO: 1.99 K/UL
LYMPHOCYTES NFR BLD AUTO: 25.1 %
MAN DIFF?: NORMAL
MCHC RBC-ENTMCNC: 31.3 PG
MCHC RBC-ENTMCNC: 33.4 GM/DL
MCV RBC AUTO: 93.5 FL
MICROALBUMIN 24H UR DL<=1MG/L-MCNC: <1.2 MG/DL
MICROALBUMIN/CREAT 24H UR-RTO: NORMAL MG/G
MICROSCOPIC-UA: NORMAL
MONOCYTES # BLD AUTO: 0.58 K/UL
MONOCYTES NFR BLD AUTO: 7.3 %
NEUTROPHILS # BLD AUTO: 5 K/UL
NEUTROPHILS NFR BLD AUTO: 63.2 %
NITRITE URINE: NEGATIVE
NONHDLC SERPL-MCNC: 63 MG/DL
PH URINE: 7
PLATELET # BLD AUTO: 259 K/UL
POTASSIUM SERPL-SCNC: 4.4 MMOL/L
PROT SERPL-MCNC: 7.4 G/DL
PROTEIN URINE: NEGATIVE MG/DL
PSA SERPL-MCNC: 0.8 NG/ML
RBC # BLD: 4.96 M/UL
RBC # FLD: 12 %
RED BLOOD CELLS URINE: 0 /HPF
SODIUM SERPL-SCNC: 142 MMOL/L
SPECIFIC GRAVITY URINE: 1.01
T4 SERPL-MCNC: 7.1 UG/DL
TRIGL SERPL-MCNC: 139 MG/DL
TSH SERPL-ACNC: 3.47 UIU/ML
URATE SERPL-MCNC: 3.3 MG/DL
UROBILINOGEN URINE: 0.2 MG/DL
WBC # FLD AUTO: 7.92 K/UL
WHITE BLOOD CELLS URINE: 0 /HPF

## 2024-02-02 RX ORDER — INSULIN GLARGINE 100 [IU]/ML
100 INJECTION, SOLUTION SUBCUTANEOUS
Qty: 45 | Refills: 0 | Status: DISCONTINUED | COMMUNITY
End: 2024-02-02

## 2024-02-07 ENCOUNTER — TRANSCRIPTION ENCOUNTER (OUTPATIENT)
Age: 69
End: 2024-02-07

## 2024-02-08 ENCOUNTER — APPOINTMENT (OUTPATIENT)
Dept: GASTROENTEROLOGY | Facility: GI CENTER | Age: 69
End: 2024-02-08
Payer: COMMERCIAL

## 2024-02-08 ENCOUNTER — OUTPATIENT (OUTPATIENT)
Dept: OUTPATIENT SERVICES | Facility: HOSPITAL | Age: 69
LOS: 1 days | End: 2024-02-08
Payer: COMMERCIAL

## 2024-02-08 DIAGNOSIS — Z98.890 OTHER SPECIFIED POSTPROCEDURAL STATES: Chronic | ICD-10-CM

## 2024-02-08 DIAGNOSIS — Z12.11 ENCOUNTER FOR SCREENING FOR MALIGNANT NEOPLASM OF COLON: ICD-10-CM

## 2024-02-08 LAB — GLUCOSE BLDC GLUCOMTR-MCNC: 154 MG/DL — HIGH (ref 70–99)

## 2024-02-08 PROCEDURE — 45378 DIAGNOSTIC COLONOSCOPY: CPT | Mod: PT

## 2024-02-08 PROCEDURE — 82962 GLUCOSE BLOOD TEST: CPT

## 2024-02-08 NOTE — PHYSICAL EXAM
[Alert] : alert [Normal Voice/Communication] : normal voice/communication [Healthy Appearing] : healthy appearing [No Acute Distress] : no acute distress [Sclera] : the sclera and conjunctiva were normal [Hearing Threshold Finger Rub Not [place]] : hearing was normal [Normal Lips/Gums] : the lips and gums were normal [Oropharynx] : the oropharynx was normal [Normal Appearance] : the appearance of the neck was normal [No Respiratory Distress] : no respiratory distress [No Acc Muscle Use] : no accessory muscle use [Respiration, Rhythm And Depth] : normal respiratory rhythm and effort [Auscultation Breath Sounds / Voice Sounds] : lungs were clear to auscultation bilaterally [Heart Rate And Rhythm] : heart rate was normal and rhythm regular [Normal S1, S2] : normal S1 and S2 [Murmurs] : no murmurs [Bowel Sounds] : normal bowel sounds [Abdomen Tenderness] : non-tender [No Masses] : no abdominal mass palpated [Abdomen Soft] : soft [] : no hepatosplenomegaly [Oriented To Time, Place, And Person] : oriented to person, place, and time [de-identified] : There was a moderate sized umbilical hernia present

## 2024-02-14 ENCOUNTER — APPOINTMENT (OUTPATIENT)
Dept: SURGERY | Facility: CLINIC | Age: 69
End: 2024-02-14
Payer: COMMERCIAL

## 2024-02-14 VITALS
TEMPERATURE: 99.3 F | DIASTOLIC BLOOD PRESSURE: 81 MMHG | BODY MASS INDEX: 30.61 KG/M2 | OXYGEN SATURATION: 95 % | HEART RATE: 60 BPM | SYSTOLIC BLOOD PRESSURE: 158 MMHG | WEIGHT: 226 LBS | HEIGHT: 72 IN | RESPIRATION RATE: 16 BRPM

## 2024-02-14 DIAGNOSIS — E66.9 OBESITY, UNSPECIFIED: ICD-10-CM

## 2024-02-14 DIAGNOSIS — K42.9 UMBILICAL HERNIA W/OUT OBSTRUCTION OR GANGRENE: ICD-10-CM

## 2024-02-14 PROCEDURE — 99203 OFFICE O/P NEW LOW 30 MIN: CPT

## 2024-02-14 NOTE — PHYSICAL EXAM
[JVD] : no jugular venous distention  [Purpura] : no purpura  [Petechiae] : no petechiae [Skin Ulcer] : no ulcer [Skin Induration] : no induration [Alert] : alert [Oriented to Person] : oriented to person [Oriented to Place] : oriented to place [Oriented to Time] : oriented to time [Calm] : calm [de-identified] : nontoxic, in no acute distress, [de-identified] : NC/AT PERRL EOMI no scleral icterus  [de-identified] : trachea midline, no gross mass  [de-identified] : no audible wheezing or stridor  [de-identified] : obese, protuberant, no localizing tenderness, no guarding, no rebound, no masses  [de-identified] : FROM of all extremities with no gross angulation or deformity, there is a soft reducible non tender umbilical hernia, no ventral hernia, there is a 2 cm defect palpated in the hernia site [de-identified] : there is active psoriasis of the abdominal wall including the umbilicus  [de-identified] : mood is calm

## 2024-02-14 NOTE — HISTORY OF PRESENT ILLNESS
[de-identified] : The patient comes to the office in consultation by Dr. Camilo Lewis for evaluation of an umbilical hernia.  The patient reports that he has had the lump in the belly button for many years, greater than 10 years. He has no abdominal pain, no nausea, and no vomit.  The patient states he has no limitations to activity, exercising daily at home and the gym.

## 2024-02-14 NOTE — CONSULT LETTER
[Dear  ___] : Dear  [unfilled], [Consult Letter:] : I had the pleasure of evaluating your patient, [unfilled]. [Please see my note below.] : Please see my note below. [Consult Closing:] : Thank you very much for allowing me to participate in the care of this patient.  If you have any questions, please do not hesitate to contact me. [Sincerely,] : Sincerely, [FreeTextEntry3] : Sohan Verdugo MD, FACS   Department of Surgery [facility]

## 2024-02-14 NOTE — ASSESSMENT
[FreeTextEntry1] : The patient is a 68-year-old obese male with a reducible asymptomatic umbilical hernia.  The patient has been advised that he will benefit from weight loss prior to any surgery.  The patient has been advised that weight loss will be an important adjunct to help potentially reduce the risks of infection, hernia recurrence, and chronic post operative pain associated with surgery by potentially reducing the tension along the abdominal wall.  The risks, benefits, and alternatives including the option of doing nothing to a robotic, possible laparoscopic, and possible open umbilical hernia repair with possible mesh were discussed.  The potential complications including but not limited to infection, bleeding, hernia recurrence, chronic post-operative pain, and seroma formation were discussed.  The patient was educated regarding the signs and symptoms of hernia strangulation and advised to seek immediate MD evaluation should this occur.  The patient understands and will embark on a weight loss routine with a target of losing 20 to 25 pounds. He will follow up in 8 weeks to assess interval stability.  A total of 40 minutes was spent coordinating the patient's care.

## 2024-04-08 ENCOUNTER — APPOINTMENT (OUTPATIENT)
Dept: SURGERY | Facility: CLINIC | Age: 69
End: 2024-04-08

## 2024-04-12 RX ORDER — INSULIN GLARGINE 100 [IU]/ML
100 INJECTION, SOLUTION SUBCUTANEOUS DAILY
Qty: 7 | Refills: 0 | Status: ACTIVE | COMMUNITY
Start: 2022-03-14 | End: 1900-01-01

## 2024-04-22 RX ORDER — FLASH GLUCOSE SENSOR
KIT MISCELLANEOUS
Qty: 6 | Refills: 3 | Status: ACTIVE | COMMUNITY
Start: 2022-04-18

## 2024-04-29 RX ORDER — FLASH GLUCOSE SENSOR
KIT MISCELLANEOUS
Qty: 6 | Refills: 3 | Status: ACTIVE | COMMUNITY
Start: 2022-04-19

## 2024-09-10 ENCOUNTER — APPOINTMENT (OUTPATIENT)
Dept: FAMILY MEDICINE | Facility: CLINIC | Age: 69
End: 2024-09-10

## 2024-09-10 VITALS — DIASTOLIC BLOOD PRESSURE: 80 MMHG | HEART RATE: 76 BPM | SYSTOLIC BLOOD PRESSURE: 126 MMHG | RESPIRATION RATE: 16 BRPM

## 2024-09-10 VITALS
TEMPERATURE: 97.2 F | DIASTOLIC BLOOD PRESSURE: 80 MMHG | BODY MASS INDEX: 29.93 KG/M2 | WEIGHT: 221 LBS | OXYGEN SATURATION: 97 % | SYSTOLIC BLOOD PRESSURE: 130 MMHG | HEIGHT: 72 IN | HEART RATE: 79 BPM

## 2024-09-10 DIAGNOSIS — E11.9 TYPE 2 DIABETES MELLITUS W/OUT COMPLICATIONS: ICD-10-CM

## 2024-09-10 DIAGNOSIS — I10 ESSENTIAL (PRIMARY) HYPERTENSION: ICD-10-CM

## 2024-09-10 DIAGNOSIS — M65.352 TRIGGER FINGER, LEFT LITTLE FINGER: ICD-10-CM

## 2024-09-10 DIAGNOSIS — I25.10 ATHEROSCLEROTIC HEART DISEASE OF NATIVE CORONARY ARTERY W/OUT ANGINA PECTORIS: ICD-10-CM

## 2024-09-10 DIAGNOSIS — E78.5 HYPERLIPIDEMIA, UNSPECIFIED: ICD-10-CM

## 2024-09-10 PROCEDURE — 90662 IIV NO PRSV INCREASED AG IM: CPT

## 2024-09-10 PROCEDURE — G0008: CPT

## 2024-09-10 PROCEDURE — 96372 THER/PROPH/DIAG INJ SC/IM: CPT

## 2024-09-10 PROCEDURE — 99204 OFFICE O/P NEW MOD 45 MIN: CPT | Mod: 25

## 2024-09-10 RX ORDER — METHYLPRED ACET/NACL,ISO-OS/PF 40 MG/ML
40 VIAL (ML) INJECTION
Qty: 0 | Refills: 0 | Status: COMPLETED | OUTPATIENT
Start: 2024-09-10

## 2024-09-10 RX ADMIN — METHYLPREDNISOLONE ACETATE 0 MG/ML: 40 INJECTION, SUSPENSION INTRA-ARTICULAR; INTRALESIONAL; INTRAMUSCULAR; SOFT TISSUE at 00:00

## 2024-09-10 NOTE — HISTORY OF PRESENT ILLNESS
[Coronary Artery Disease] : Coronary Artery Disease [Diabetes Mellitus] : Diabetes Mellitus [Hyperlipidemia] : Hyperlipidemia [Hypertension] : Hypertension [No episodes] : No hypoglycemic episodes since the last visit. [Check glucose ___ x/day] : Patient checks  blood glucose [unfilled]  times a day [Most Recent A1C: ___] : Most recent A1C was [unfilled] [Target goal met] : A1C target goal met [Does not check BP] : The patient is not checking blood pressure [Doing Well] : Patient is doing well [Managed with medications] : managed with  medication [High Intensity therapy] : Patient is currently on high intensity statin therapy [Well Controlled] : Overall status has been well controlled [FreeTextEntry6] : pt here for management of DM HLD HTN HAS COLONOSCOPY SCHEDULED  PT HAS RETIRED  [Symptoms Limit Activities] : Symptoms do not limit ~his/her~ activities [de-identified] : NEVER  SMOKED

## 2024-09-10 NOTE — ASSESSMENT
[FreeTextEntry1] : TRIGER FINGER  INJECTRED WITH MEDROL  CAD CONTINUE ASA DM CONTINUE LANTUS BP ACCEPTABLE CONTINUE HCTZ  HLD COONTINUE ATORVASTATIN

## 2024-09-10 NOTE — PHYSICAL EXAM
[No Acute Distress] : no acute distress [PERRL] : pupils equal round and reactive to light [Normal TMs] : both tympanic membranes were normal [Supple] : supple [Clear to Auscultation] : lungs were clear to auscultation bilaterally [Regular Rhythm] : with a regular rhythm [No Edema] : there was no peripheral edema [Normal] : soft, non-tender, non-distended, no masses palpated, no HSM and normal bowel sounds [Normal Supraclavicular Nodes] : no supraclavicular lymphadenopathy [Normal Anterior Cervical Nodes] : no anterior cervical lymphadenopathy [No CVA Tenderness] : no CVA  tenderness [No Rash] : no rash [No Focal Deficits] : no focal deficits [Normal Insight/Judgement] : insight and judgment were intact [de-identified] : TRIGER FINGER  RT 5 TH DIGIT

## 2024-09-11 ENCOUNTER — TRANSCRIPTION ENCOUNTER (OUTPATIENT)
Age: 69
End: 2024-09-11

## 2024-09-11 LAB
ALBUMIN SERPL ELPH-MCNC: 4.5 G/DL
ALP BLD-CCNC: 77 U/L
ALT SERPL-CCNC: 26 U/L
ANION GAP SERPL CALC-SCNC: 13 MMOL/L
APPEARANCE: CLEAR
AST SERPL-CCNC: 28 U/L
BACTERIA: NEGATIVE /HPF
BASOPHILS # BLD AUTO: 0.04 K/UL
BASOPHILS NFR BLD AUTO: 0.4 %
BILIRUB SERPL-MCNC: 1.3 MG/DL
BILIRUBIN URINE: NEGATIVE
BLOOD URINE: NEGATIVE
BUN SERPL-MCNC: 11 MG/DL
CALCIUM SERPL-MCNC: 9.4 MG/DL
CAST: 0 /LPF
CHLORIDE SERPL-SCNC: 95 MMOL/L
CHOLEST SERPL-MCNC: 85 MG/DL
CO2 SERPL-SCNC: 24 MMOL/L
COLOR: YELLOW
CREAT SERPL-MCNC: 0.81 MG/DL
CREAT SPEC-SCNC: 50 MG/DL
EGFR: 96 ML/MIN/1.73M2
EOSINOPHIL # BLD AUTO: 0.09 K/UL
EOSINOPHIL NFR BLD AUTO: 1 %
EPITHELIAL CELLS: 0 /HPF
ESTIMATED AVERAGE GLUCOSE: 160 MG/DL
GLUCOSE QUALITATIVE U: NEGATIVE MG/DL
GLUCOSE SERPL-MCNC: 105 MG/DL
HBA1C MFR BLD HPLC: 7.2 %
HCT VFR BLD CALC: 43.7 %
HDLC SERPL-MCNC: 30 MG/DL
HGB BLD-MCNC: 14.4 G/DL
IMM GRANULOCYTES NFR BLD AUTO: 0.8 %
KETONES URINE: NEGATIVE MG/DL
LDLC SERPL CALC-MCNC: 36 MG/DL
LEUKOCYTE ESTERASE URINE: NEGATIVE
LYMPHOCYTES # BLD AUTO: 2.31 K/UL
LYMPHOCYTES NFR BLD AUTO: 25.7 %
MAN DIFF?: NORMAL
MCHC RBC-ENTMCNC: 31 PG
MCHC RBC-ENTMCNC: 33 GM/DL
MCV RBC AUTO: 94.2 FL
MICROALBUMIN 24H UR DL<=1MG/L-MCNC: <1.2 MG/DL
MICROALBUMIN/CREAT 24H UR-RTO: NORMAL MG/G
MICROSCOPIC-UA: NORMAL
MONOCYTES # BLD AUTO: 0.82 K/UL
MONOCYTES NFR BLD AUTO: 9.1 %
NEUTROPHILS # BLD AUTO: 5.65 K/UL
NEUTROPHILS NFR BLD AUTO: 63 %
NITRITE URINE: NEGATIVE
NONHDLC SERPL-MCNC: 54 MG/DL
PH URINE: 7.5
PLATELET # BLD AUTO: 285 K/UL
POTASSIUM SERPL-SCNC: 4.5 MMOL/L
PROT SERPL-MCNC: 6.9 G/DL
PROTEIN URINE: NEGATIVE MG/DL
PSA SERPL-MCNC: 0.95 NG/ML
RBC # BLD: 4.64 M/UL
RBC # FLD: 11.9 %
RED BLOOD CELLS URINE: 0 /HPF
SODIUM SERPL-SCNC: 133 MMOL/L
SPECIFIC GRAVITY URINE: 1.01
T4 SERPL-MCNC: 7.4 UG/DL
TRIGL SERPL-MCNC: 95 MG/DL
TSH SERPL-ACNC: 3.47 UIU/ML
URATE SERPL-MCNC: 3.3 MG/DL
UROBILINOGEN URINE: 0.2 MG/DL
WBC # FLD AUTO: 8.98 K/UL
WHITE BLOOD CELLS URINE: 0 /HPF

## 2024-11-13 ENCOUNTER — APPOINTMENT (OUTPATIENT)
Dept: CARDIOLOGY | Facility: CLINIC | Age: 69
End: 2024-11-13
Payer: MEDICARE

## 2024-11-13 ENCOUNTER — NON-APPOINTMENT (OUTPATIENT)
Age: 69
End: 2024-11-13

## 2024-11-13 VITALS
WEIGHT: 220 LBS | HEIGHT: 72 IN | SYSTOLIC BLOOD PRESSURE: 130 MMHG | HEART RATE: 87 BPM | BODY MASS INDEX: 29.8 KG/M2 | DIASTOLIC BLOOD PRESSURE: 76 MMHG | OXYGEN SATURATION: 98 %

## 2024-11-13 DIAGNOSIS — I45.10 UNSPECIFIED RIGHT BUNDLE-BRANCH BLOCK: ICD-10-CM

## 2024-11-13 DIAGNOSIS — I10 ESSENTIAL (PRIMARY) HYPERTENSION: ICD-10-CM

## 2024-11-13 DIAGNOSIS — I25.10 ATHEROSCLEROTIC HEART DISEASE OF NATIVE CORONARY ARTERY W/OUT ANGINA PECTORIS: ICD-10-CM

## 2024-11-13 PROCEDURE — 99204 OFFICE O/P NEW MOD 45 MIN: CPT

## 2024-11-13 PROCEDURE — G2211 COMPLEX E/M VISIT ADD ON: CPT

## 2024-11-13 PROCEDURE — 93000 ELECTROCARDIOGRAM COMPLETE: CPT

## 2024-11-15 ENCOUNTER — APPOINTMENT (OUTPATIENT)
Dept: ORTHOPEDIC SURGERY | Facility: CLINIC | Age: 69
End: 2024-11-15

## 2024-11-15 VITALS — WEIGHT: 226 LBS | BODY MASS INDEX: 30.61 KG/M2 | HEIGHT: 72 IN

## 2024-11-15 DIAGNOSIS — M77.11 LATERAL EPICONDYLITIS, RIGHT ELBOW: ICD-10-CM

## 2024-11-15 DIAGNOSIS — M65.352 TRIGGER FINGER, LEFT LITTLE FINGER: ICD-10-CM

## 2024-11-15 PROCEDURE — 99204 OFFICE O/P NEW MOD 45 MIN: CPT

## 2024-11-22 LAB
ANION GAP SERPL CALC-SCNC: 11 MMOL/L
BASOPHILS # BLD AUTO: 0.05 K/UL
BASOPHILS NFR BLD AUTO: 0.6 %
BUN SERPL-MCNC: 11 MG/DL
CALCIUM SERPL-MCNC: 9.6 MG/DL
CHLORIDE SERPL-SCNC: 96 MMOL/L
CO2 SERPL-SCNC: 27 MMOL/L
CREAT SERPL-MCNC: 0.79 MG/DL
EGFR: 97 ML/MIN/1.73M2
EOSINOPHIL # BLD AUTO: 0.15 K/UL
EOSINOPHIL NFR BLD AUTO: 1.7 %
GLUCOSE SERPL-MCNC: 118 MG/DL
HCT VFR BLD CALC: 43.6 %
HGB BLD-MCNC: 14.6 G/DL
IMM GRANULOCYTES NFR BLD AUTO: 0.5 %
LYMPHOCYTES # BLD AUTO: 2.35 K/UL
LYMPHOCYTES NFR BLD AUTO: 26.6 %
MAN DIFF?: NORMAL
MCHC RBC-ENTMCNC: 31.3 PG
MCHC RBC-ENTMCNC: 33.5 G/DL
MCV RBC AUTO: 93.6 FL
MONOCYTES # BLD AUTO: 0.77 K/UL
MONOCYTES NFR BLD AUTO: 8.7 %
NEUTROPHILS # BLD AUTO: 5.46 K/UL
NEUTROPHILS NFR BLD AUTO: 61.9 %
PLATELET # BLD AUTO: 260 K/UL
POTASSIUM SERPL-SCNC: 4.1 MMOL/L
RBC # BLD: 4.66 M/UL
RBC # FLD: 12.2 %
SODIUM SERPL-SCNC: 134 MMOL/L
WBC # FLD AUTO: 8.82 K/UL

## 2024-12-04 ENCOUNTER — APPOINTMENT (OUTPATIENT)
Age: 69
End: 2024-12-04
Payer: MEDICARE

## 2024-12-04 PROCEDURE — 26055 INCISE FINGER TENDON SHEATH: CPT | Mod: F4

## 2024-12-04 PROCEDURE — 25607 OPTX DST RD XARTC FX/EPI SEP: CPT | Mod: AS,LT

## 2024-12-13 ENCOUNTER — APPOINTMENT (OUTPATIENT)
Dept: ORTHOPEDIC SURGERY | Facility: CLINIC | Age: 69
End: 2024-12-13

## 2024-12-16 ENCOUNTER — APPOINTMENT (OUTPATIENT)
Dept: ORTHOPEDIC SURGERY | Facility: CLINIC | Age: 69
End: 2024-12-16
Payer: MEDICARE

## 2024-12-16 VITALS — WEIGHT: 226 LBS | HEIGHT: 72 IN | BODY MASS INDEX: 30.61 KG/M2

## 2024-12-16 DIAGNOSIS — M65.352 TRIGGER FINGER, LEFT LITTLE FINGER: ICD-10-CM

## 2024-12-16 PROCEDURE — 99024 POSTOP FOLLOW-UP VISIT: CPT

## 2025-01-10 ENCOUNTER — RX RENEWAL (OUTPATIENT)
Age: 70
End: 2025-01-10

## 2025-01-11 ENCOUNTER — RX RENEWAL (OUTPATIENT)
Age: 70
End: 2025-01-11

## 2025-01-13 ENCOUNTER — APPOINTMENT (OUTPATIENT)
Dept: ORTHOPEDIC SURGERY | Facility: CLINIC | Age: 70
End: 2025-01-13
Payer: MEDICARE

## 2025-01-13 ENCOUNTER — APPOINTMENT (OUTPATIENT)
Dept: MRI IMAGING | Facility: CLINIC | Age: 70
End: 2025-01-13
Payer: MEDICARE

## 2025-01-13 PROCEDURE — 99213 OFFICE O/P EST LOW 20 MIN: CPT | Mod: 24

## 2025-01-13 PROCEDURE — 73221 MRI JOINT UPR EXTREM W/O DYE: CPT | Mod: RT

## 2025-01-15 ENCOUNTER — NON-APPOINTMENT (OUTPATIENT)
Age: 70
End: 2025-01-15

## 2025-01-20 ENCOUNTER — APPOINTMENT (OUTPATIENT)
Dept: ORTHOPEDIC SURGERY | Facility: CLINIC | Age: 70
End: 2025-01-20
Payer: MEDICARE

## 2025-01-20 VITALS — HEIGHT: 72 IN | BODY MASS INDEX: 30.88 KG/M2 | WEIGHT: 228 LBS

## 2025-01-20 DIAGNOSIS — M77.11 LATERAL EPICONDYLITIS, RIGHT ELBOW: ICD-10-CM

## 2025-01-20 DIAGNOSIS — M65.352 TRIGGER FINGER, LEFT LITTLE FINGER: ICD-10-CM

## 2025-01-20 PROCEDURE — 99213 OFFICE O/P EST LOW 20 MIN: CPT | Mod: 24

## 2025-02-20 ENCOUNTER — APPOINTMENT (OUTPATIENT)
Dept: FAMILY MEDICINE | Facility: CLINIC | Age: 70
End: 2025-02-20
Payer: MEDICARE

## 2025-02-20 VITALS
BODY MASS INDEX: 31.83 KG/M2 | OXYGEN SATURATION: 96 % | HEART RATE: 76 BPM | HEIGHT: 72 IN | SYSTOLIC BLOOD PRESSURE: 122 MMHG | DIASTOLIC BLOOD PRESSURE: 70 MMHG | TEMPERATURE: 98 F | WEIGHT: 235 LBS

## 2025-02-20 VITALS — DIASTOLIC BLOOD PRESSURE: 70 MMHG | SYSTOLIC BLOOD PRESSURE: 120 MMHG | RESPIRATION RATE: 16 BRPM | HEART RATE: 70 BPM

## 2025-02-20 DIAGNOSIS — E11.9 TYPE 2 DIABETES MELLITUS W/OUT COMPLICATIONS: ICD-10-CM

## 2025-02-20 DIAGNOSIS — E78.5 HYPERLIPIDEMIA, UNSPECIFIED: ICD-10-CM

## 2025-02-20 DIAGNOSIS — I10 ESSENTIAL (PRIMARY) HYPERTENSION: ICD-10-CM

## 2025-02-20 DIAGNOSIS — I25.10 ATHEROSCLEROTIC HEART DISEASE OF NATIVE CORONARY ARTERY W/OUT ANGINA PECTORIS: ICD-10-CM

## 2025-02-20 PROCEDURE — G2211 COMPLEX E/M VISIT ADD ON: CPT

## 2025-02-20 PROCEDURE — 99214 OFFICE O/P EST MOD 30 MIN: CPT

## 2025-02-21 ENCOUNTER — TRANSCRIPTION ENCOUNTER (OUTPATIENT)
Age: 70
End: 2025-02-21

## 2025-02-21 LAB
ALBUMIN SERPL ELPH-MCNC: 4.6 G/DL
ALP BLD-CCNC: 87 U/L
ALT SERPL-CCNC: 39 U/L
ANION GAP SERPL CALC-SCNC: 13 MMOL/L
APPEARANCE: CLEAR
AST SERPL-CCNC: 36 U/L
BACTERIA: NEGATIVE /HPF
BASOPHILS # BLD AUTO: 0.05 K/UL
BASOPHILS NFR BLD AUTO: 0.6 %
BILIRUB SERPL-MCNC: 1.5 MG/DL
BILIRUBIN URINE: NEGATIVE
BLOOD URINE: NEGATIVE
BUN SERPL-MCNC: 10 MG/DL
CALCIUM SERPL-MCNC: 9.9 MG/DL
CAST: 0 /LPF
CHLORIDE SERPL-SCNC: 95 MMOL/L
CHOLEST SERPL-MCNC: 112 MG/DL
CO2 SERPL-SCNC: 25 MMOL/L
COLOR: YELLOW
CREAT SERPL-MCNC: 0.79 MG/DL
CREAT SPEC-SCNC: 30 MG/DL
EGFR: 97 ML/MIN/1.73M2
EOSINOPHIL # BLD AUTO: 0.1 K/UL
EOSINOPHIL NFR BLD AUTO: 1.1 %
EPITHELIAL CELLS: 0 /HPF
ESTIMATED AVERAGE GLUCOSE: 151 MG/DL
GLUCOSE QUALITATIVE U: NEGATIVE MG/DL
GLUCOSE SERPL-MCNC: 77 MG/DL
HBA1C MFR BLD HPLC: 6.9 %
HCT VFR BLD CALC: 44.4 %
HDLC SERPL-MCNC: 36 MG/DL
HGB BLD-MCNC: 15.1 G/DL
IMM GRANULOCYTES NFR BLD AUTO: 0.4 %
KETONES URINE: NEGATIVE MG/DL
LDLC SERPL CALC-MCNC: 54 MG/DL
LEUKOCYTE ESTERASE URINE: NEGATIVE
LYMPHOCYTES # BLD AUTO: 2.38 K/UL
LYMPHOCYTES NFR BLD AUTO: 26.7 %
MAN DIFF?: NORMAL
MCHC RBC-ENTMCNC: 31.3 PG
MCHC RBC-ENTMCNC: 34 G/DL
MCV RBC AUTO: 92.1 FL
MICROALBUMIN 24H UR DL<=1MG/L-MCNC: <1.2 MG/DL
MICROALBUMIN/CREAT 24H UR-RTO: NORMAL MG/G
MICROSCOPIC-UA: NORMAL
MONOCYTES # BLD AUTO: 0.72 K/UL
MONOCYTES NFR BLD AUTO: 8.1 %
NEUTROPHILS # BLD AUTO: 5.63 K/UL
NEUTROPHILS NFR BLD AUTO: 63.1 %
NITRITE URINE: NEGATIVE
NONHDLC SERPL-MCNC: 76 MG/DL
PH URINE: 7
PLATELET # BLD AUTO: 274 K/UL
POTASSIUM SERPL-SCNC: 4.3 MMOL/L
PROT SERPL-MCNC: 7.1 G/DL
PROTEIN URINE: NEGATIVE MG/DL
RBC # BLD: 4.82 M/UL
RBC # FLD: 11.7 %
RED BLOOD CELLS URINE: 0 /HPF
SODIUM SERPL-SCNC: 134 MMOL/L
SPECIFIC GRAVITY URINE: 1.01
T4 SERPL-MCNC: 8.2 UG/DL
TRIGL SERPL-MCNC: 119 MG/DL
TSH SERPL-ACNC: 3.28 UIU/ML
URATE SERPL-MCNC: 3.9 MG/DL
UROBILINOGEN URINE: 0.2 MG/DL
WBC # FLD AUTO: 8.92 K/UL
WHITE BLOOD CELLS URINE: 0 /HPF

## 2025-02-24 ENCOUNTER — APPOINTMENT (OUTPATIENT)
Dept: ORTHOPEDIC SURGERY | Facility: CLINIC | Age: 70
End: 2025-02-24
Payer: MEDICARE

## 2025-02-24 DIAGNOSIS — M65.352 TRIGGER FINGER, LEFT LITTLE FINGER: ICD-10-CM

## 2025-02-24 DIAGNOSIS — M77.11 LATERAL EPICONDYLITIS, RIGHT ELBOW: ICD-10-CM

## 2025-02-24 PROCEDURE — 99213 OFFICE O/P EST LOW 20 MIN: CPT | Mod: 24

## 2025-04-07 ENCOUNTER — APPOINTMENT (OUTPATIENT)
Dept: ORTHOPEDIC SURGERY | Facility: CLINIC | Age: 70
End: 2025-04-07
Payer: MEDICARE

## 2025-04-07 VITALS — HEIGHT: 72 IN | BODY MASS INDEX: 31.83 KG/M2 | WEIGHT: 235 LBS

## 2025-04-07 DIAGNOSIS — M77.11 LATERAL EPICONDYLITIS, RIGHT ELBOW: ICD-10-CM

## 2025-04-07 DIAGNOSIS — M65.352 TRIGGER FINGER, LEFT LITTLE FINGER: ICD-10-CM

## 2025-04-07 PROCEDURE — 99212 OFFICE O/P EST SF 10 MIN: CPT

## 2025-06-09 ENCOUNTER — APPOINTMENT (OUTPATIENT)
Dept: ORTHOPEDIC SURGERY | Facility: CLINIC | Age: 70
End: 2025-06-09
Payer: MEDICARE

## 2025-06-09 PROCEDURE — 99212 OFFICE O/P EST SF 10 MIN: CPT

## (undated) DEVICE — KIT DEFENDO 4 OLY 4 PC